# Patient Record
Sex: MALE | Race: BLACK OR AFRICAN AMERICAN | Employment: UNEMPLOYED | URBAN - NONMETROPOLITAN AREA
[De-identification: names, ages, dates, MRNs, and addresses within clinical notes are randomized per-mention and may not be internally consistent; named-entity substitution may affect disease eponyms.]

---

## 2018-05-24 ENCOUNTER — OFFICE VISIT (OUTPATIENT)
Dept: UROLOGY | Age: 58
End: 2018-05-24
Payer: COMMERCIAL

## 2018-05-24 VITALS — BODY MASS INDEX: 26.69 KG/M2 | WEIGHT: 208 LBS | TEMPERATURE: 97.8 F | HEIGHT: 74 IN

## 2018-05-24 DIAGNOSIS — N40.1 BPH WITH OBSTRUCTION/LOWER URINARY TRACT SYMPTOMS: ICD-10-CM

## 2018-05-24 DIAGNOSIS — N13.8 BPH WITH OBSTRUCTION/LOWER URINARY TRACT SYMPTOMS: ICD-10-CM

## 2018-05-24 DIAGNOSIS — R31.0 GROSS HEMATURIA: ICD-10-CM

## 2018-05-24 DIAGNOSIS — N20.0 KIDNEY STONE: Primary | ICD-10-CM

## 2018-05-24 LAB
APPEARANCE FLUID: NORMAL
BILIRUBIN, POC: NORMAL
BLOOD URINE, POC: NORMAL
CLARITY, POC: CLEAR
COLOR, POC: YELLOW
GLUCOSE URINE, POC: NORMAL
KETONES, POC: NORMAL
LEUKOCYTE EST, POC: NORMAL
NITRITE, POC: NORMAL
PH, POC: 7
PROTEIN, POC: NORMAL
SPECIFIC GRAVITY, POC: 1.01
UROBILINOGEN, POC: 0.2

## 2018-05-24 PROCEDURE — 99203 OFFICE O/P NEW LOW 30 MIN: CPT | Performed by: PHYSICIAN ASSISTANT

## 2018-05-24 PROCEDURE — 51798 US URINE CAPACITY MEASURE: CPT | Performed by: PHYSICIAN ASSISTANT

## 2018-05-24 PROCEDURE — 81003 URINALYSIS AUTO W/O SCOPE: CPT | Performed by: PHYSICIAN ASSISTANT

## 2018-05-24 RX ORDER — TAMSULOSIN HYDROCHLORIDE 0.4 MG/1
0.4 CAPSULE ORAL DAILY
Qty: 30 CAPSULE | Refills: 3
Start: 2018-05-24 | End: 2018-10-29

## 2018-05-24 RX ORDER — LISINOPRIL 10 MG/1
20 TABLET ORAL DAILY
Status: ON HOLD | COMMUNITY
End: 2018-12-05 | Stop reason: HOSPADM

## 2018-05-24 RX ORDER — AMLODIPINE BESYLATE 10 MG/1
10 TABLET ORAL DAILY
Status: ON HOLD | COMMUNITY
End: 2018-12-05 | Stop reason: HOSPADM

## 2018-05-24 RX ORDER — TAMSULOSIN HYDROCHLORIDE 0.4 MG/1
0.4 CAPSULE ORAL DAILY
COMMUNITY
End: 2018-08-16

## 2018-05-24 RX ORDER — ATORVASTATIN CALCIUM 10 MG/1
10 TABLET, FILM COATED ORAL DAILY
Status: ON HOLD | COMMUNITY
End: 2018-08-16 | Stop reason: ALTCHOICE

## 2018-05-24 RX ORDER — FINASTERIDE 5 MG/1
5 TABLET, FILM COATED ORAL DAILY
COMMUNITY
End: 2018-10-29

## 2018-05-24 RX ORDER — DOXYCYCLINE HYCLATE 50 MG/1
324 CAPSULE, GELATIN COATED ORAL
Status: ON HOLD | COMMUNITY
End: 2018-08-16 | Stop reason: ALTCHOICE

## 2018-05-24 ASSESSMENT — ENCOUNTER SYMPTOMS
SORE THROAT: 0
DOUBLE VISION: 0
NAUSEA: 0
SHORTNESS OF BREATH: 0
WHEEZING: 0
HEARTBURN: 0
BLURRED VISION: 0

## 2018-07-05 ENCOUNTER — TELEPHONE (OUTPATIENT)
Dept: UROLOGY | Age: 58
End: 2018-07-05

## 2018-08-13 ENCOUNTER — PROCEDURE VISIT (OUTPATIENT)
Dept: UROLOGY | Age: 58
End: 2018-08-13
Payer: COMMERCIAL

## 2018-08-13 VITALS — WEIGHT: 210 LBS | BODY MASS INDEX: 26.95 KG/M2 | TEMPERATURE: 98.2 F | HEIGHT: 74 IN

## 2018-08-13 DIAGNOSIS — N13.8 BPH WITH OBSTRUCTION/LOWER URINARY TRACT SYMPTOMS: Primary | ICD-10-CM

## 2018-08-13 DIAGNOSIS — R31.0 GROSS HEMATURIA: ICD-10-CM

## 2018-08-13 DIAGNOSIS — N20.0 RENAL CALCULUS, LEFT: ICD-10-CM

## 2018-08-13 DIAGNOSIS — N40.1 BPH WITH OBSTRUCTION/LOWER URINARY TRACT SYMPTOMS: Primary | ICD-10-CM

## 2018-08-13 LAB
BILIRUBIN, POC: 0
BLOOD URINE, POC: NORMAL
CLARITY, POC: CLEAR
COLOR, POC: YELLOW
GLUCOSE URINE, POC: NORMAL
KETONES, POC: NORMAL
LEUKOCYTE EST, POC: NORMAL
NITRITE, POC: NORMAL
PH, POC: 6
PROTEIN, POC: NORMAL
SPECIFIC GRAVITY, POC: 1.02
UROBILINOGEN, POC: 1

## 2018-08-13 PROCEDURE — 81003 URINALYSIS AUTO W/O SCOPE: CPT | Performed by: UROLOGY

## 2018-08-13 PROCEDURE — 52000 CYSTOURETHROSCOPY: CPT | Performed by: UROLOGY

## 2018-08-13 PROCEDURE — 99214 OFFICE O/P EST MOD 30 MIN: CPT | Performed by: UROLOGY

## 2018-08-13 NOTE — LETTER
CONSENT TO OPERATION  AND/OR OTHER PROCEDURE(S)          PATIENT :  Telma Medrano            YOB: 1960      DATE :  8/13/18        1. I request and consent that Dr. Roxanne Mayes, and/or his associates or assistants perform an operation and/or procedures on the above patient at  [facility], to treat the condition(s) which appear indicated by the diagnostic studies already performed. I have been told that in general terms the nature, purpose and reasonable expectations of the operation and/or procedure(s) are:     2. It has been explained to me by the informing physician that during the course of the operation and/or procedure(s) unforeseen conditions may be revealed that necessitate an extension of the original operation and/or procedure(s) or different operation and/or procedures than those set forth in Paragraph 1. I therefore authorize and request that my physician and/or his associates or assistants perform such operations and/or procedures as are necessary and desirable in the exercise of professional judgment. The authority granted under this Paragraph 2 shall extend to all conditions that require treatment and are known to my physician at the time the operation is commenced. 3. I have been made aware by the informing physician of certain risks and consequences that are associated with the operation and/or procedure(s) described in Paragraph 1, the reasonable alternative methods or treatment, the possible consequences, the possibility that the operation and/or procedure(s) may be unsuccessful and the possibility of complications. I understand the reasonably known risks to be:      ? Bleeding  ? Infection  ? Poor Healing  ? Possible Damage to Nerve, Vessel, Tendon/Muscle or Bone  ? Need for further Treatment/Surgery  ? Stiffness  ? Pain  ? Residual or Recurrent Symptoms  ? Anesthetic and/or Medical Risks          4.  I have also been informed by the informing physician that there are other risks from both known and unknown causes that are attendant to the performance of any surgical procedure. I am aware that the practice of medicine and surgery is not an exact science, and that no guarantees have been made to me concerning the results of the operation and/or procedure(s). 5. I   CONSENT / REFUSE CONSENT  (strike the phrase that does not apply) to the taking of photographs before, during and/or after the operation or procedure for scientific/educational purposes. 6. I consent to the administration of anesthesia and to the use of such anesthetics as may be deemed advisable by the anesthesiologist who has been engaged by me or my physician. 7. I certify that I have read and understand the above consent to operation and/or other procedure(s); that the explanations therein referred to were made to me by the informing physician in advance of my signing this consent; that all blanks or statements requiring insertion or completion were filled in and inapplicable paragraphs, if any, were stricken before I signed; and that all questions asked by me about the operation and/or procedure(s) which I have consented to have been fully answered in a satisfactory manner.             _______________________  Witness        Signature Of Patient        _______________________  Informing Physician         Signature of Informing Physician           If patient is unable to sign or is a minor, complete one of the following:    (A)  Patient is a minor   years of age. (B)  Patient is unable to sign because: The undersigned represents that he or she is duly authorized to execute this consent for and on behalf of the above named patient.                Witness         o  Parent  o  Guardian   o  Spouse      o  Other (specify)

## 2018-08-13 NOTE — PROGRESS NOTES
Hematuria  Patient complains of gross hematuria. Onset of hematuria was 5 months ago and was sudden in onset. There is a history of nephrolithiasis. There is not a history of urologic trauma. Other urologic symptoms include slow stream, hesitancy, intermittency, nocturia, urgency, sense of residual urine. . Prior workup has been UA'S, CT. Patient had CT urogram 05/14/2018 which showed a nonobstructing stone in the left renal pelvis. Patient states he had sudden onset of gross hematuria with clots which lasted about a month patient states the past month it is been clear. He does have history of urinary tract symptoms both obstructive and irritative. He is on finasteride and tamsulosin.      Obstructive and irritative urinary tract symptoms:  Patient is a 79-year-old gentleman with several month history of worsening urinary tract symptoms. He is on finasteride and tamsulosin. He describes poor stream and straining and hesitancy and feeling of incomplete emptying as well as irritative symptoms are urgency frequency and nocturia. He did have a PSA done 04/18/18 that was 1.79. There is no family or personal history of prostate cancer. Patient had episode of gross hematuria which lasted about a month for the past month his urine has been clear. CT urogram states there is moderate enlargement of the prostate.     Kidney stones:  Patient is a 79-year-old gentleman referred to us for gross hematuria and voiding dysfunction who in the course of his evaluation had a CT urogram done. I was not able to view the disc but the report did state there is a nonobstructing calcification within the renal pelvis but there is no mention of size. Patient is not aware of previous history kidney stones he comes in now after getting a KUB.     Is here for cystoscopy for evaluation of hematuria and his obstructive voiding symptoms        Past Medical History        Past Medical History:   Diagnosis Date    GERD (gastroesophageal reflux

## 2018-08-14 ENCOUNTER — TELEPHONE (OUTPATIENT)
Dept: UROLOGY | Age: 58
End: 2018-08-14

## 2018-08-15 ENCOUNTER — TELEPHONE (OUTPATIENT)
Dept: UROLOGY | Age: 58
End: 2018-08-15

## 2018-08-15 NOTE — TELEPHONE ENCOUNTER
I spoke with [facility] from the halfway facility and I let her know that I spoke with Shauna Quick, and that his recommendation was to keep him on the Bactrim until his culture came back and to alternate between Tylenol and Motrin to keep the fever down, but if he seemed to be septic, he needed to go to the ER. She voiced understanding and stated that she would fax a copy of the culture when it came in.

## 2018-08-15 NOTE — TELEPHONE ENCOUNTER
Please call clifton at the [facility]. Patient having symptoms after cysto. She faxed over some notes and labs. Please see fax in media.

## 2018-08-16 ENCOUNTER — HOSPITAL ENCOUNTER (INPATIENT)
Age: 58
LOS: 7 days | Discharge: HOME | DRG: 872 | End: 2018-08-23
Attending: EMERGENCY MEDICINE | Admitting: UROLOGY
Payer: COMMERCIAL

## 2018-08-16 ENCOUNTER — APPOINTMENT (OUTPATIENT)
Dept: CT IMAGING | Age: 58
DRG: 872 | End: 2018-08-16
Payer: COMMERCIAL

## 2018-08-16 ENCOUNTER — TELEPHONE (OUTPATIENT)
Dept: UROLOGY | Age: 58
End: 2018-08-16

## 2018-08-16 DIAGNOSIS — A41.9 SEPSIS, DUE TO UNSPECIFIED ORGANISM: Primary | ICD-10-CM

## 2018-08-16 DIAGNOSIS — N20.0 NEPHROLITHIASIS: ICD-10-CM

## 2018-08-16 DIAGNOSIS — N17.9 ACUTE RENAL FAILURE, UNSPECIFIED ACUTE RENAL FAILURE TYPE (HCC): ICD-10-CM

## 2018-08-16 DIAGNOSIS — N40.1 BENIGN PROSTATIC HYPERPLASIA WITH LOWER URINARY TRACT SYMPTOMS, SYMPTOM DETAILS UNSPECIFIED: ICD-10-CM

## 2018-08-16 LAB
ALBUMIN SERPL-MCNC: 3.6 G/DL (ref 3.5–5.2)
ALP BLD-CCNC: 55 U/L (ref 40–130)
ALT SERPL-CCNC: 12 U/L (ref 5–41)
ANION GAP SERPL CALCULATED.3IONS-SCNC: 12 MMOL/L (ref 7–19)
AST SERPL-CCNC: 23 U/L (ref 5–40)
BACTERIA: ABNORMAL /HPF
BANDED NEUTROPHILS RELATIVE PERCENT: 24 % (ref 0–5)
BASOPHILS ABSOLUTE: 0 K/UL (ref 0–0.2)
BASOPHILS MANUAL: 0 %
BASOPHILS RELATIVE PERCENT: 0 % (ref 0–1)
BILIRUB SERPL-MCNC: 0.3 MG/DL (ref 0.2–1.2)
BILIRUBIN URINE: ABNORMAL
BLOOD, URINE: ABNORMAL
BUN BLDV-MCNC: 49 MG/DL (ref 6–20)
CALCIUM SERPL-MCNC: 9.1 MG/DL (ref 8.6–10)
CASTS: ABNORMAL /LPF
CHLORIDE BLD-SCNC: 99 MMOL/L (ref 98–111)
CLARITY: ABNORMAL
CO2: 23 MMOL/L (ref 22–29)
COLOR: ABNORMAL
CREAT SERPL-MCNC: 3.7 MG/DL (ref 0.5–1.2)
EOSINOPHILS ABSOLUTE: 0 K/UL (ref 0–0.6)
EOSINOPHILS RELATIVE PERCENT: 0 % (ref 0–5)
EPITHELIAL CELLS, UA: ABNORMAL /HPF
GFR NON-AFRICAN AMERICAN: 17
GLUCOSE BLD-MCNC: 119 MG/DL (ref 74–109)
GLUCOSE URINE: NEGATIVE MG/DL
HCT VFR BLD CALC: 30.8 % (ref 42–52)
HEMOGLOBIN: 10.2 G/DL (ref 14–18)
KETONES, URINE: NEGATIVE MG/DL
LACTIC ACID: 2 MMOL/L (ref 0.5–1.9)
LEUKOCYTE ESTERASE, URINE: ABNORMAL
LYMPHOCYTES ABSOLUTE: 1.2 K/UL (ref 1.1–4.5)
LYMPHOCYTES RELATIVE PERCENT: 3 % (ref 20–40)
MCH RBC QN AUTO: 28 PG (ref 27–31)
MCHC RBC AUTO-ENTMCNC: 33.1 G/DL (ref 33–37)
MCV RBC AUTO: 84.6 FL (ref 80–94)
MONOCYTES ABSOLUTE: 1.2 K/UL (ref 0–0.9)
MONOCYTES RELATIVE PERCENT: 3 % (ref 0–10)
MUCUS: ABNORMAL /LPF
NEUTROPHILS ABSOLUTE: 37 K/UL (ref 1.5–7.5)
NEUTROPHILS MANUAL: 70 %
NEUTROPHILS RELATIVE PERCENT: 70 % (ref 50–65)
NITRITE, URINE: NEGATIVE
PDW BLD-RTO: 13.4 % (ref 11.5–14.5)
PH UA: 5
PLATELET # BLD: 295 K/UL (ref 130–400)
PLATELET SLIDE REVIEW: ADEQUATE
PMV BLD AUTO: 10.2 FL (ref 9.4–12.4)
POTASSIUM SERPL-SCNC: 3.9 MMOL/L (ref 3.5–5)
PROTEIN UA: ABNORMAL MG/DL
RBC # BLD: 3.64 M/UL (ref 4.7–6.1)
RBC # BLD: NORMAL 10*6/UL
RBC UA: ABNORMAL /HPF (ref 0–2)
SODIUM BLD-SCNC: 134 MMOL/L (ref 136–145)
SPECIFIC GRAVITY UA: 1.02
TOTAL PROTEIN: 7.1 G/DL (ref 6.6–8.7)
URINE REFLEX TO CULTURE: YES
UROBILINOGEN, URINE: 0.2 E.U./DL
WBC # BLD: 39.4 K/UL (ref 4.8–10.8)
WBC UA: ABNORMAL /HPF (ref 0–5)

## 2018-08-16 PROCEDURE — 2580000003 HC RX 258: Performed by: NURSE PRACTITIONER

## 2018-08-16 PROCEDURE — 2000000000 HC ICU R&B

## 2018-08-16 PROCEDURE — 74150 CT ABDOMEN W/O CONTRAST: CPT

## 2018-08-16 PROCEDURE — 87186 SC STD MICRODIL/AGAR DIL: CPT

## 2018-08-16 PROCEDURE — 81001 URINALYSIS AUTO W/SCOPE: CPT

## 2018-08-16 PROCEDURE — 6360000002 HC RX W HCPCS: Performed by: EMERGENCY MEDICINE

## 2018-08-16 PROCEDURE — 87077 CULTURE AEROBIC IDENTIFY: CPT

## 2018-08-16 PROCEDURE — 2580000003 HC RX 258: Performed by: EMERGENCY MEDICINE

## 2018-08-16 PROCEDURE — 6360000002 HC RX W HCPCS: Performed by: UROLOGY

## 2018-08-16 PROCEDURE — 36415 COLL VENOUS BLD VENIPUNCTURE: CPT

## 2018-08-16 PROCEDURE — 80053 COMPREHEN METABOLIC PANEL: CPT

## 2018-08-16 PROCEDURE — 99285 EMERGENCY DEPT VISIT HI MDM: CPT

## 2018-08-16 PROCEDURE — 6370000000 HC RX 637 (ALT 250 FOR IP): Performed by: UROLOGY

## 2018-08-16 PROCEDURE — 2580000003 HC RX 258: Performed by: UROLOGY

## 2018-08-16 PROCEDURE — 99223 1ST HOSP IP/OBS HIGH 75: CPT | Performed by: UROLOGY

## 2018-08-16 PROCEDURE — 99285 EMERGENCY DEPT VISIT HI MDM: CPT | Performed by: EMERGENCY MEDICINE

## 2018-08-16 PROCEDURE — 93005 ELECTROCARDIOGRAM TRACING: CPT

## 2018-08-16 PROCEDURE — 94664 DEMO&/EVAL PT USE INHALER: CPT

## 2018-08-16 PROCEDURE — 85025 COMPLETE CBC W/AUTO DIFF WBC: CPT

## 2018-08-16 PROCEDURE — 87086 URINE CULTURE/COLONY COUNT: CPT

## 2018-08-16 PROCEDURE — 6360000002 HC RX W HCPCS: Performed by: INTERNAL MEDICINE

## 2018-08-16 PROCEDURE — 83605 ASSAY OF LACTIC ACID: CPT

## 2018-08-16 PROCEDURE — 87040 BLOOD CULTURE FOR BACTERIA: CPT

## 2018-08-16 RX ORDER — SODIUM CHLORIDE 0.9 % (FLUSH) 0.9 %
10 SYRINGE (ML) INJECTION PRN
Status: DISCONTINUED | OUTPATIENT
Start: 2018-08-16 | End: 2018-08-18

## 2018-08-16 RX ORDER — 0.9 % SODIUM CHLORIDE 0.9 %
1000 INTRAVENOUS SOLUTION INTRAVENOUS ONCE
Status: COMPLETED | OUTPATIENT
Start: 2018-08-16 | End: 2018-08-16

## 2018-08-16 RX ORDER — SODIUM CHLORIDE 0.9 % (FLUSH) 0.9 %
10 SYRINGE (ML) INJECTION EVERY 12 HOURS SCHEDULED
Status: DISCONTINUED | OUTPATIENT
Start: 2018-08-16 | End: 2018-08-18

## 2018-08-16 RX ORDER — SODIUM CHLORIDE 9 MG/ML
INJECTION, SOLUTION INTRAVENOUS CONTINUOUS
Status: DISCONTINUED | OUTPATIENT
Start: 2018-08-16 | End: 2018-08-18

## 2018-08-16 RX ORDER — FERROUS GLUCONATE 324(37.5)
324 TABLET ORAL
Status: DISCONTINUED | OUTPATIENT
Start: 2018-08-17 | End: 2018-08-18

## 2018-08-16 RX ORDER — ONDANSETRON 2 MG/ML
4 INJECTION INTRAMUSCULAR; INTRAVENOUS EVERY 4 HOURS PRN
Status: DISCONTINUED | OUTPATIENT
Start: 2018-08-16 | End: 2018-08-23 | Stop reason: HOSPADM

## 2018-08-16 RX ORDER — TAMSULOSIN HYDROCHLORIDE 0.4 MG/1
0.8 CAPSULE ORAL DAILY
Status: DISCONTINUED | OUTPATIENT
Start: 2018-08-16 | End: 2018-08-23 | Stop reason: HOSPADM

## 2018-08-16 RX ORDER — ATORVASTATIN CALCIUM 10 MG/1
10 TABLET, FILM COATED ORAL DAILY
Status: DISCONTINUED | OUTPATIENT
Start: 2018-08-17 | End: 2018-08-18

## 2018-08-16 RX ORDER — LISINOPRIL 10 MG/1
10 TABLET ORAL DAILY
Status: DISCONTINUED | OUTPATIENT
Start: 2018-08-17 | End: 2018-08-23 | Stop reason: HOSPADM

## 2018-08-16 RX ORDER — ACETAMINOPHEN 325 MG/1
650 TABLET ORAL EVERY 4 HOURS PRN
Status: DISCONTINUED | OUTPATIENT
Start: 2018-08-16 | End: 2018-08-23 | Stop reason: HOSPADM

## 2018-08-16 RX ORDER — SODIUM CHLORIDE 0.9 % (FLUSH) 0.9 %
10 SYRINGE (ML) INJECTION EVERY 12 HOURS SCHEDULED
Status: DISCONTINUED | OUTPATIENT
Start: 2018-08-16 | End: 2018-08-23 | Stop reason: HOSPADM

## 2018-08-16 RX ORDER — AMLODIPINE BESYLATE 10 MG/1
10 TABLET ORAL DAILY
Status: DISCONTINUED | OUTPATIENT
Start: 2018-08-16 | End: 2018-08-23 | Stop reason: HOSPADM

## 2018-08-16 RX ORDER — MEPERIDINE HYDROCHLORIDE 50 MG/ML
6.25 INJECTION INTRAMUSCULAR; INTRAVENOUS; SUBCUTANEOUS 2 TIMES DAILY PRN
Status: DISCONTINUED | OUTPATIENT
Start: 2018-08-16 | End: 2018-08-23 | Stop reason: HOSPADM

## 2018-08-16 RX ORDER — SODIUM CHLORIDE 0.9 % (FLUSH) 0.9 %
10 SYRINGE (ML) INJECTION PRN
Status: DISCONTINUED | OUTPATIENT
Start: 2018-08-16 | End: 2018-08-23 | Stop reason: HOSPADM

## 2018-08-16 RX ORDER — SODIUM CHLORIDE 9 MG/ML
INJECTION, SOLUTION INTRAVENOUS CONTINUOUS
Status: DISCONTINUED | OUTPATIENT
Start: 2018-08-16 | End: 2018-08-23 | Stop reason: HOSPADM

## 2018-08-16 RX ORDER — FINASTERIDE 5 MG/1
5 TABLET, FILM COATED ORAL DAILY
Status: DISCONTINUED | OUTPATIENT
Start: 2018-08-16 | End: 2018-08-23 | Stop reason: HOSPADM

## 2018-08-16 RX ORDER — FAMOTIDINE 20 MG/1
20 TABLET, FILM COATED ORAL 2 TIMES DAILY
Status: DISCONTINUED | OUTPATIENT
Start: 2018-08-16 | End: 2018-08-17 | Stop reason: DRUGHIGH

## 2018-08-16 RX ADMIN — ACETAMINOPHEN 650 MG: 325 TABLET ORAL at 15:46

## 2018-08-16 RX ADMIN — MEPERIDINE HYDROCHLORIDE 6.5 MG: 50 INJECTION, SOLUTION INTRAMUSCULAR; INTRAVENOUS; SUBCUTANEOUS at 20:13

## 2018-08-16 RX ADMIN — SODIUM CHLORIDE 1000 ML: 9 INJECTION, SOLUTION INTRAVENOUS at 11:30

## 2018-08-16 RX ADMIN — FAMOTIDINE 20 MG: 20 TABLET, FILM COATED ORAL at 20:13

## 2018-08-16 RX ADMIN — ONDANSETRON 4 MG: 2 INJECTION INTRAMUSCULAR; INTRAVENOUS at 19:38

## 2018-08-16 RX ADMIN — SODIUM CHLORIDE: 9 INJECTION, SOLUTION INTRAVENOUS at 15:49

## 2018-08-16 RX ADMIN — Medication 10 ML: at 20:13

## 2018-08-16 RX ADMIN — ACETAMINOPHEN 650 MG: 325 TABLET ORAL at 19:45

## 2018-08-16 RX ADMIN — SODIUM CHLORIDE: 9 INJECTION, SOLUTION INTRAVENOUS at 17:13

## 2018-08-16 RX ADMIN — TAMSULOSIN HYDROCHLORIDE 0.8 MG: 0.4 CAPSULE ORAL at 15:46

## 2018-08-16 RX ADMIN — MEROPENEM 1 G: 1 INJECTION, POWDER, FOR SOLUTION INTRAVENOUS at 21:11

## 2018-08-16 RX ADMIN — MEROPENEM 1 G: 1 INJECTION, POWDER, FOR SOLUTION INTRAVENOUS at 13:37

## 2018-08-16 RX ADMIN — ENOXAPARIN SODIUM 30 MG: 30 INJECTION SUBCUTANEOUS at 15:46

## 2018-08-16 ASSESSMENT — PAIN SCALES - GENERAL
PAINLEVEL_OUTOF10: 0
PAINLEVEL_OUTOF10: 3
PAINLEVEL_OUTOF10: 0
PAINLEVEL_OUTOF10: 3

## 2018-08-16 ASSESSMENT — ENCOUNTER SYMPTOMS
VOMITING: 0
CONSTIPATION: 0
NAUSEA: 0
ABDOMINAL PAIN: 1
DIARRHEA: 0

## 2018-08-16 NOTE — ED PROVIDER NOTES
Attending Supervisory Note/Shared Visit   I have personally performed a face to face diagnostic evaluation on this patient. I have reviewed the mid-levels findings and agree. Mr. Beatriz Gibson is a 61-year-old male presents to the emergency department from [facility] due to fever and urinary retention. Spoke with the fever had a temperature of 103 Fahrenheit yesterday. Patient is given a gram of Rocephin and bactrim yesterday. He has a long history of enlarged prostate and is followed by Dr. Viktoria Jackson scheduled upcoming TURP. Patient saw Viktoria Jackson on 8/13/18 most recently for gross hematuria that started 5moths ago. May 14 had CT that showed non obstructing stone in left renal pelvis. He is on medical therapy for BPH. Per Dr. Viktoria Jackson note recommends possibly taking care of stone before TURP. Patient admits to 2 episodes of watery diarrhea this AM but otherwise no other infectious symptoms. VSS, non toxic, RRR no murmur, lungs clear, abd soft with some mild suprapubic and LLQ pain on exam, no cva tenderness, no LE edema    Proceed with labs and CT imaging, BP initially low in 80s now 117/77 afebrile here, cont to monitor 1217    Patient with significant leukocytosis, acute renal failure, with recent renal function creatinine 1.5 yesterday now worsened, will d/w urology [address] [place] [address],[address] spoke with Dr. Viktoria Jackson request meropenem, heart, admit to ICU, orders placed, pt cont to be HDS      FINAL IMPRESSION      1. Sepsis, due to unspecified organism ([address]    2. Acute renal failure, unspecified acute renal failure type ([address]    3. Benign prostatic hyperplasia with lower urinary tract symptoms, symptom details unspecified    4.  Nephrolithiasis          Layla Davis MD  Attending Emergency Physician       Jihan Mckee MD  08/16/18 7940
CULTURE BLOOD #2   URINE RT REFLEX TO CULTURE       All other labs were within normal range or not returned as of this dictation. RE-ASSESSMENT        EMERGENCY DEPARTMENT COURSE and DIFFERENTIAL DIAGNOSIS/MDM:   Vitals:    Vitals:    08/16/18 1114 08/16/18 1202   BP: 80/68 117/77   Pulse: 88    Temp: 98.2 °F (36.8 °C)    TempSrc: Oral    SpO2: 92%        MDM  Number of Diagnoses or Management Options  Acute renal failure, unspecified acute renal failure type [facility]): new, no workup  Benign prostatic hyperplasia with lower urinary tract symptoms, symptom details unspecified: new, needed workup  Nephrolithiasis: new, needed workup  Sepsis, due to unspecified organism [facility]): new, needed workup  Diagnosis management comments: Will initiate IV, send blood and urine for analysis, establish IV fluids, obtain CT renal.  Case discussed with Dr. Christi Ugalde. CT reveals 9 mm nonobstructing calculus; bladder wall thicking due to outlet obstruction-infiltrative malignancy not excluded. Labs reveal leukocytosis and acute renal failure  Dr. Christi Ugalde updated. Consulted Dr. Gilbert Hummel. He accepted patient for admission to ICU. He requested meropenem and a heart. Amount and/or Complexity of Data Reviewed  Clinical lab tests: ordered and reviewed  Tests in the radiology section of CPT®: ordered and reviewed  Discuss the patient with other providers: yes (Dr. Christi Ugalde  1250: Dr. Gilbert Hummel)          Procedures      FINAL IMPRESSION      1. Sepsis, due to unspecified organism ([address]    2. Acute renal failure, unspecified acute renal failure type ([address]    3. Ureterolithiasis    4.  Benign prostatic hyperplasia with lower urinary tract symptoms, symptom details unspecified          DISPOSITION/PLAN   DISPOSITION        PATIENT REFERRED TO:  Unspecified [facility]            DISCHARGE MEDICATIONS:  New Prescriptions    No medications on file       (Please note that portions of this note were completed with a voice recognition program.  Efforts

## 2018-08-16 NOTE — TELEPHONE ENCOUNTER
I spoke with [facility] from the [facility] that houses Mr Iman Anton and she stated that his bp was 70/40, pulse was 104, and not running a fever. She wanted to know what Dr Tatianna Diez recommendations were. (see the previous telephone encounter) I spoke with Dr Clarissa Serrato about this and he stated that with him being hypotensive and tachycardic, he might be septic and needs to be transported to the ER for evaluation.  Linda voiced understanding and stated that she would have him transferred to the ER

## 2018-08-17 LAB
ANION GAP SERPL CALCULATED.3IONS-SCNC: 14 MMOL/L (ref 7–19)
ATYPICAL LYMPHOCYTE RELATIVE PERCENT: 1 % (ref 0–8)
BANDED NEUTROPHILS RELATIVE PERCENT: 1 % (ref 0–5)
BASOPHILS ABSOLUTE: 0 K/UL (ref 0–0.2)
BASOPHILS MANUAL: 0 %
BASOPHILS RELATIVE PERCENT: 0 % (ref 0–1)
BUN BLDV-MCNC: 45 MG/DL (ref 6–20)
BURR CELLS: ABNORMAL
CALCIUM SERPL-MCNC: 8.2 MG/DL (ref 8.6–10)
CHLORIDE BLD-SCNC: 101 MMOL/L (ref 98–111)
CO2: 19 MMOL/L (ref 22–29)
CREAT SERPL-MCNC: 2.3 MG/DL (ref 0.5–1.2)
EOSINOPHILS ABSOLUTE: 0 K/UL (ref 0–0.6)
EOSINOPHILS RELATIVE PERCENT: 0 % (ref 0–5)
GFR NON-AFRICAN AMERICAN: 30
GLUCOSE BLD-MCNC: 113 MG/DL (ref 74–109)
HCT VFR BLD CALC: 30.6 % (ref 42–52)
HEMOGLOBIN: 9.4 G/DL (ref 14–18)
HYPOCHROMIA: ABNORMAL
LYMPHOCYTES ABSOLUTE: 3.1 K/UL (ref 1.1–4.5)
LYMPHOCYTES RELATIVE PERCENT: 10 % (ref 20–40)
MCH RBC QN AUTO: 27.1 PG (ref 27–31)
MCHC RBC AUTO-ENTMCNC: 30.7 G/DL (ref 33–37)
MCV RBC AUTO: 88.2 FL (ref 80–94)
MONOCYTES ABSOLUTE: 0 K/UL (ref 0–0.9)
MONOCYTES RELATIVE PERCENT: 0 % (ref 0–10)
NEUTROPHILS ABSOLUTE: 25.1 K/UL (ref 1.5–7.5)
NEUTROPHILS MANUAL: 88 %
NEUTROPHILS RELATIVE PERCENT: 88 % (ref 50–65)
OVALOCYTES: ABNORMAL
PDW BLD-RTO: 13.7 % (ref 11.5–14.5)
PLATELET # BLD: 244 K/UL (ref 130–400)
PLATELET SLIDE REVIEW: ADEQUATE
PMV BLD AUTO: 9.9 FL (ref 9.4–12.4)
POLYCHROMASIA: ABNORMAL
POTASSIUM REFLEX MAGNESIUM: 3.9 MMOL/L (ref 3.5–5)
RBC # BLD: 3.47 M/UL (ref 4.7–6.1)
SODIUM BLD-SCNC: 134 MMOL/L (ref 136–145)
WBC # BLD: 28.2 K/UL (ref 4.8–10.8)

## 2018-08-17 PROCEDURE — 6360000002 HC RX W HCPCS: Performed by: INTERNAL MEDICINE

## 2018-08-17 PROCEDURE — 85025 COMPLETE CBC W/AUTO DIFF WBC: CPT

## 2018-08-17 PROCEDURE — 6370000000 HC RX 637 (ALT 250 FOR IP): Performed by: UROLOGY

## 2018-08-17 PROCEDURE — 87040 BLOOD CULTURE FOR BACTERIA: CPT

## 2018-08-17 PROCEDURE — 36415 COLL VENOUS BLD VENIPUNCTURE: CPT

## 2018-08-17 PROCEDURE — 99232 SBSQ HOSP IP/OBS MODERATE 35: CPT | Performed by: UROLOGY

## 2018-08-17 PROCEDURE — 80048 BASIC METABOLIC PNL TOTAL CA: CPT

## 2018-08-17 PROCEDURE — 2580000003 HC RX 258: Performed by: UROLOGY

## 2018-08-17 PROCEDURE — 6360000002 HC RX W HCPCS: Performed by: UROLOGY

## 2018-08-17 PROCEDURE — 2000000000 HC ICU R&B

## 2018-08-17 RX ORDER — LEVOFLOXACIN 5 MG/ML
750 INJECTION, SOLUTION INTRAVENOUS ONCE
Status: COMPLETED | OUTPATIENT
Start: 2018-08-17 | End: 2018-08-17

## 2018-08-17 RX ORDER — FAMOTIDINE 20 MG/1
20 TABLET, FILM COATED ORAL NIGHTLY
Status: DISCONTINUED | OUTPATIENT
Start: 2018-08-17 | End: 2018-08-18

## 2018-08-17 RX ADMIN — MEPERIDINE HYDROCHLORIDE 6.5 MG: 50 INJECTION, SOLUTION INTRAMUSCULAR; INTRAVENOUS; SUBCUTANEOUS at 10:58

## 2018-08-17 RX ADMIN — MEROPENEM 1 G: 1 INJECTION, POWDER, FOR SOLUTION INTRAVENOUS at 05:02

## 2018-08-17 RX ADMIN — LISINOPRIL 10 MG: 10 TABLET ORAL at 08:25

## 2018-08-17 RX ADMIN — ENOXAPARIN SODIUM 30 MG: 30 INJECTION SUBCUTANEOUS at 14:56

## 2018-08-17 RX ADMIN — TAMSULOSIN HYDROCHLORIDE 0.8 MG: 0.4 CAPSULE ORAL at 08:25

## 2018-08-17 RX ADMIN — ONDANSETRON 4 MG: 2 INJECTION INTRAMUSCULAR; INTRAVENOUS at 19:01

## 2018-08-17 RX ADMIN — ACETAMINOPHEN 650 MG: 325 TABLET ORAL at 10:55

## 2018-08-17 RX ADMIN — FAMOTIDINE 20 MG: 20 TABLET, FILM COATED ORAL at 20:09

## 2018-08-17 RX ADMIN — Medication 10 ML: at 20:09

## 2018-08-17 RX ADMIN — Medication 10 ML: at 08:28

## 2018-08-17 RX ADMIN — MEROPENEM 1 G: 1 INJECTION, POWDER, FOR SOLUTION INTRAVENOUS at 16:50

## 2018-08-17 RX ADMIN — AMLODIPINE BESYLATE 10 MG: 10 TABLET ORAL at 08:25

## 2018-08-17 RX ADMIN — FINASTERIDE 5 MG: 5 TABLET, FILM COATED ORAL at 08:25

## 2018-08-17 RX ADMIN — ACETAMINOPHEN 650 MG: 325 TABLET ORAL at 21:06

## 2018-08-17 RX ADMIN — ACETAMINOPHEN 650 MG: 325 TABLET ORAL at 14:56

## 2018-08-17 RX ADMIN — ACETAMINOPHEN 650 MG: 325 TABLET ORAL at 05:06

## 2018-08-17 RX ADMIN — LEVOFLOXACIN 750 MG: 5 INJECTION, SOLUTION INTRAVENOUS at 20:09

## 2018-08-17 ASSESSMENT — PAIN SCALES - GENERAL
PAINLEVEL_OUTOF10: 6
PAINLEVEL_OUTOF10: 6
PAINLEVEL_OUTOF10: 2
PAINLEVEL_OUTOF10: 2
PAINLEVEL_OUTOF10: 0
PAINLEVEL_OUTOF10: 0

## 2018-08-17 ASSESSMENT — ENCOUNTER SYMPTOMS: VOMITING: 0

## 2018-08-17 ASSESSMENT — PAIN DESCRIPTION - LOCATION: LOCATION: OTHER (COMMENT)

## 2018-08-17 NOTE — H&P
RACHELL AIRVEND OF [facility] [address], [address] [facility]                               HISTORY AND PHYSICAL    PATIENT NAME: Clem Beach                      :        1960  MED REC NO:   [id number]                              ROOM:       [facility]  ACCOUNT NO:   [de-identified]                           ADMIT DATE: 2018  PROVIDER:     Gregg Alanis MD      ADMISSION DIAGNOSES:  1. Sepsis,  source. 2.  Acute kidney injury. 3.  BPH with obstructive voiding symptoms. 4.  Kidney stone. HISTORY:  The patient is a 81-year-old gentleman who is in a [employer] work  camp. He was seen by me on 2018, for evaluation for gross hematuria. He had onset of hematuria approximately 5 months ago. He had obstructive  voiding symptoms, including a slow stream, hesitancy, intermittency,  nocturia and a sense of incomplete emptying. He had a CT urogram done on  2018, which showed a nonobstructing stone in the left kidney. No  hydronephrosis. He was started on maximal medical therapy with finasteride  and tamsulosin. He had a PSA in 2018 that was 1.79. Because of the  gross hematuria, the patient was evaluated with cystoscopy on , which  showed a very large prostate with 3+ trilobar prostatic hyperplasia and a  very large ball valve type median lobe protruding intravesically into the  bladder with obstructive changes and trabeculation. At that time of his  evaluation on , his urine was completely clear. Since he also had a  kidney stone which could be seen on KUB, the plan was to go ahead and treat  his kidney stone and then he would need followup TURP. However, the next  day he began having fevers and chills and more difficulty urinating. The  patient states that he had a fever up to 103. He was seen locally and he  thinks cultures from urine and blood were taken. He also was administered  antibiotics such as Rocephin and Bactrim.   He having rigors but is awake and alert. His  temperature was 103, pulse 82, respirations 22. Blood pressure 101/49,  this is improved after fluid resuscitation. HEENT:  His head is normocephalic, atraumatic. Pupils equal.  Sclerae  clear. Extraocular movements are intact. Mucous membranes are dry. NECK:  Trachea midline. No JVD. Full range of motion. No mass. CHEST:  Clear to auscultation bilaterally. HEART:  Regular, slightly tachy. No murmur. ABDOMEN:  Soft, nondistended, nontender, no palpable mass. Liver and  spleen are not enlarged. :  He has an uncircumcised phallus. He has a Ni catheter in place  draining clear urine. His testes are slightly swollen bilaterally but not  indurated and mildly tender. RECTAL:  Examination was deferred. SKIN:  Normal.  NEUROLOGIC:  Normal.  MUSCULOSKELETAL:  Both upper and lower extremities are without gross  abnormality. DIAGNOSTIC STUDIES:  Urinalysis was cloudy, small blood, small leukocyte  esterase, negative nitrite, 2 to 4 casts, 3 to 5 wbc's, 2 to 4 rbc's, 3 to  5 epithelial cells and trace bacteria. His white blood cell count was  13.94, hemoglobin 10.2, hematocrit 38.8, platelets 860. His sodium is 134,  potassium 3.9, chloride 99, bicarb 23, glucose 119, BUN is 49, creatinine  is 3.7, GFR is 17. Liver function tests normal.  Lactic acid was elevated  at 2.0. Blood, urine cultures have been taken. He has been started on  Merrem. X-ray studies:  He had a CT scan of the abdomen and pelvis without  contrast, showed urinary bladder wall thickening and a large prostate with  protrusion into the floor of the bladder. IMPRESSION:  1. Sepsis,  source. Cultures have been taken. We will continue  aggressive hydration. He has had some improvement and is hemodynamically  stable after some fluid resuscitation. I have started him on Merrem and  broad-spectrum cultures have been obtained.   Would ask infectious disease  consultation to assist in antibiotic coverage and for management of his  sepsis. 2.  Acute kidney injury secondary to #1 and also has had some Bactrim. We  will see if this resolves after fluid resuscitation, following some serial  creatinine. 3.  BPH with obstructive voiding symptoms. He is on maximal medical  therapy with tamsulosin and Flomax. We will leave Ni catheter in place  for now. 4.  Left renal calculus, nonobstructing. He was to be scheduled for ESWL. We will have to postpone this until his infection has cleared.           Savita Glass MD    D: 08/16/2018 21:06:43      T: 08/16/2018 21:10:07     [email or web address]  Job#: [id number]     Doc#: [id number]    CC:

## 2018-08-17 NOTE — PLAN OF CARE
Problem: Falls - Risk of:  Goal: Will remain free from falls  Will remain free from falls   Outcome: Met This Shift    Goal: Absence of physical injury  Absence of physical injury   Outcome: Met This Shift      Problem: Urinary Elimination:  Goal: Complications related to the disease process, condition or treatment will be avoided or minimized  Outcome: Ongoing

## 2018-08-18 LAB
ANION GAP SERPL CALCULATED.3IONS-SCNC: 10 MMOL/L (ref 7–19)
BANDED NEUTROPHILS RELATIVE PERCENT: 4 % (ref 0–5)
BASOPHILS ABSOLUTE: 0 K/UL (ref 0–0.2)
BASOPHILS MANUAL: 0 %
BASOPHILS RELATIVE PERCENT: 0 % (ref 0–1)
BUN BLDV-MCNC: 22 MG/DL (ref 6–20)
CALCIUM SERPL-MCNC: 8.3 MG/DL (ref 8.6–10)
CHLORIDE BLD-SCNC: 104 MMOL/L (ref 98–111)
CO2: 21 MMOL/L (ref 22–29)
CREAT SERPL-MCNC: 1.2 MG/DL (ref 0.5–1.2)
EOSINOPHILS ABSOLUTE: 0 K/UL (ref 0–0.6)
EOSINOPHILS RELATIVE PERCENT: 0 % (ref 0–5)
GFR NON-AFRICAN AMERICAN: >60
GLUCOSE BLD-MCNC: 121 MG/DL (ref 74–109)
HCT VFR BLD CALC: 28 % (ref 42–52)
HEMOGLOBIN: 9.3 G/DL (ref 14–18)
LYMPHOCYTES ABSOLUTE: 0.3 K/UL (ref 1.1–4.5)
LYMPHOCYTES RELATIVE PERCENT: 1 % (ref 20–40)
MCH RBC QN AUTO: 27.5 PG (ref 27–31)
MCHC RBC AUTO-ENTMCNC: 33.2 G/DL (ref 33–37)
MCV RBC AUTO: 82.8 FL (ref 80–94)
MONOCYTES ABSOLUTE: 0.5 K/UL (ref 0–0.9)
MONOCYTES RELATIVE PERCENT: 2 % (ref 0–10)
NEUTROPHILS ABSOLUTE: 25 K/UL (ref 1.5–7.5)
NEUTROPHILS MANUAL: 93 %
NEUTROPHILS RELATIVE PERCENT: 93 % (ref 50–65)
ORGANISM: ABNORMAL
OVALOCYTES: ABNORMAL
PDW BLD-RTO: 13.4 % (ref 11.5–14.5)
PLATELET # BLD: 264 K/UL (ref 130–400)
PLATELET SLIDE REVIEW: ADEQUATE
PMV BLD AUTO: 9.8 FL (ref 9.4–12.4)
POTASSIUM REFLEX MAGNESIUM: 3.6 MMOL/L (ref 3.5–5)
RBC # BLD: 3.38 M/UL (ref 4.7–6.1)
SODIUM BLD-SCNC: 135 MMOL/L (ref 136–145)
URINE CULTURE, ROUTINE: ABNORMAL
URINE CULTURE, ROUTINE: ABNORMAL
WBC # BLD: 25.8 K/UL (ref 4.8–10.8)

## 2018-08-18 PROCEDURE — 6360000002 HC RX W HCPCS: Performed by: UROLOGY

## 2018-08-18 PROCEDURE — 2580000003 HC RX 258: Performed by: UROLOGY

## 2018-08-18 PROCEDURE — 36415 COLL VENOUS BLD VENIPUNCTURE: CPT

## 2018-08-18 PROCEDURE — 80048 BASIC METABOLIC PNL TOTAL CA: CPT

## 2018-08-18 PROCEDURE — 6370000000 HC RX 637 (ALT 250 FOR IP): Performed by: UROLOGY

## 2018-08-18 PROCEDURE — 85025 COMPLETE CBC W/AUTO DIFF WBC: CPT

## 2018-08-18 PROCEDURE — 2580000003 HC RX 258: Performed by: INTERNAL MEDICINE

## 2018-08-18 PROCEDURE — 6360000002 HC RX W HCPCS: Performed by: INTERNAL MEDICINE

## 2018-08-18 PROCEDURE — 1210000000 HC MED SURG R&B

## 2018-08-18 PROCEDURE — 99232 SBSQ HOSP IP/OBS MODERATE 35: CPT | Performed by: UROLOGY

## 2018-08-18 RX ORDER — MORPHINE SULFATE 1 MG/ML
2 INJECTION, SOLUTION EPIDURAL; INTRATHECAL; INTRAVENOUS
Status: DISCONTINUED | OUTPATIENT
Start: 2018-08-18 | End: 2018-08-23 | Stop reason: HOSPADM

## 2018-08-18 RX ORDER — KETOROLAC TROMETHAMINE 30 MG/ML
15 INJECTION, SOLUTION INTRAMUSCULAR; INTRAVENOUS EVERY 8 HOURS
Status: COMPLETED | OUTPATIENT
Start: 2018-08-18 | End: 2018-08-21

## 2018-08-18 RX ORDER — FAMOTIDINE 20 MG/1
20 TABLET, FILM COATED ORAL 2 TIMES DAILY
Status: DISCONTINUED | OUTPATIENT
Start: 2018-08-18 | End: 2018-08-23 | Stop reason: HOSPADM

## 2018-08-18 RX ORDER — OXYCODONE HYDROCHLORIDE AND ACETAMINOPHEN 5; 325 MG/1; MG/1
2 TABLET ORAL EVERY 4 HOURS PRN
Status: DISCONTINUED | OUTPATIENT
Start: 2018-08-18 | End: 2018-08-23 | Stop reason: HOSPADM

## 2018-08-18 RX ADMIN — SODIUM CHLORIDE: 9 INJECTION, SOLUTION INTRAVENOUS at 17:04

## 2018-08-18 RX ADMIN — Medication 10 ML: at 08:41

## 2018-08-18 RX ADMIN — LISINOPRIL 10 MG: 10 TABLET ORAL at 08:40

## 2018-08-18 RX ADMIN — Medication 2 G: at 08:41

## 2018-08-18 RX ADMIN — MEROPENEM 1 G: 1 INJECTION, POWDER, FOR SOLUTION INTRAVENOUS at 05:05

## 2018-08-18 RX ADMIN — TAMSULOSIN HYDROCHLORIDE 0.8 MG: 0.4 CAPSULE ORAL at 08:40

## 2018-08-18 RX ADMIN — ENOXAPARIN SODIUM 30 MG: 30 INJECTION SUBCUTANEOUS at 16:05

## 2018-08-18 RX ADMIN — AMLODIPINE BESYLATE 10 MG: 10 TABLET ORAL at 08:40

## 2018-08-18 RX ADMIN — ATORVASTATIN CALCIUM 10 MG: 10 TABLET, FILM COATED ORAL at 08:40

## 2018-08-18 RX ADMIN — FINASTERIDE 5 MG: 5 TABLET, FILM COATED ORAL at 08:40

## 2018-08-18 RX ADMIN — FAMOTIDINE 20 MG: 20 TABLET ORAL at 20:01

## 2018-08-18 RX ADMIN — KETOROLAC TROMETHAMINE 15 MG: 30 INJECTION, SOLUTION INTRAMUSCULAR; INTRAVENOUS at 17:06

## 2018-08-18 RX ADMIN — KETOROLAC TROMETHAMINE 15 MG: 30 INJECTION, SOLUTION INTRAMUSCULAR; INTRAVENOUS at 09:22

## 2018-08-18 RX ADMIN — FAMOTIDINE 20 MG: 20 TABLET ORAL at 08:40

## 2018-08-18 RX ADMIN — FERROUS GLUCONATE TAB 324 MG (37.5 MG ELEMENTAL IRON) 324 MG: 324 (37.5 FE) TAB at 08:40

## 2018-08-18 RX ADMIN — Medication 2 G: at 16:35

## 2018-08-18 ASSESSMENT — PAIN DESCRIPTION - LOCATION: LOCATION: SCROTUM

## 2018-08-18 ASSESSMENT — PAIN DESCRIPTION - DESCRIPTORS: DESCRIPTORS: DISCOMFORT;PINS AND NEEDLES

## 2018-08-18 ASSESSMENT — PAIN DESCRIPTION - FREQUENCY: FREQUENCY: INTERMITTENT

## 2018-08-18 ASSESSMENT — PAIN SCALES - GENERAL
PAINLEVEL_OUTOF10: 0
PAINLEVEL_OUTOF10: 8
PAINLEVEL_OUTOF10: 7
PAINLEVEL_OUTOF10: 6

## 2018-08-18 ASSESSMENT — PAIN DESCRIPTION - PROGRESSION: CLINICAL_PROGRESSION: GRADUALLY WORSENING

## 2018-08-18 ASSESSMENT — ENCOUNTER SYMPTOMS
EYES NEGATIVE: 1
RESPIRATORY NEGATIVE: 1
VOMITING: 0
GASTROINTESTINAL NEGATIVE: 1

## 2018-08-18 ASSESSMENT — PAIN DESCRIPTION - ONSET: ONSET: GRADUAL

## 2018-08-18 ASSESSMENT — PAIN DESCRIPTION - PAIN TYPE: TYPE: ACUTE PAIN

## 2018-08-18 NOTE — PROGRESS NOTES
INFECTIOUS DISEASES PROGRESS NOTE    Patient:  Alex Higginbotham  YOB: 1960  MRN: [id number]   Admit date: 8/16/2018   Admitting Physician: Octavio Gonzalez MD  Primary Care Physician: Unspecified [facility]    CHIEF COMPLAINT: fever and chills      Interval History:  Patient moved from the ICU and per usual, I was not notified of move. I reviewed [facility] SONALI data this morning and noted the Pseudomonas was resistant only to meropenem and changed him to cefepime. I called ELIZABETH Dsouza to tell her of change. No fax from [facility] with their updated culture. It doesn't appear that a call was placed to them either. Received one time dose of levofloxacin  8/17/ evening       He does report that he has some lower abdominal pain, some back pain  That is better. Still with  testicular edema and pain. He reports elevation of the scrotum has improved his testicular pain however, towel was removed eariler    Allergies:    Allergies   Allergen Reactions    Lipitor [Atorvastatin] Shortness Of Breath     Chest pain    Ibuprofen      Elevates his liver enzymes    Lactose Intolerance (Gi)        Current Meds:     famotidine (PEPCID) tablet 20 mg BID   ceFEPIme (MAXIPIME) 2 g in sodium chloride (PF) 20 mL IV syringe Q8H   ketorolac (TORADOL) injection 15 mg Q8H   morphine (PF) injection 2 mg Q2H PRN   oxyCODONE-acetaminophen (PERCOCET) 5-325 MG per tablet 2 tablet Q4H PRN   sodium chloride flush 0.9 % injection 10 mL 2 times per day   sodium chloride flush 0.9 % injection 10 mL PRN   acetaminophen (TYLENOL) tablet 650 mg Q4H PRN   amLODIPine (NORVASC) tablet 10 mg Daily   finasteride (PROSCAR) tablet 5 mg Daily   lisinopril (PRINIVIL;ZESTRIL) tablet 10 mg Daily   0.9 % sodium chloride infusion Continuous   magnesium hydroxide (MILK OF MAGNESIA) 400 MG/5ML suspension 30 mL Daily PRN   ondansetron (ZOFRAN) injection 4 mg Q4H PRN   tamsulosin (FLOMAX) capsule 0.8 mg Daily   enoxaparin (LOVENOX) injection 30 mg Q24H

## 2018-08-19 LAB
ALBUMIN SERPL-MCNC: 2.3 G/DL (ref 3.5–5.2)
ALP BLD-CCNC: 53 U/L (ref 40–130)
ALT SERPL-CCNC: 11 U/L (ref 5–41)
ANION GAP SERPL CALCULATED.3IONS-SCNC: 12 MMOL/L (ref 7–19)
AST SERPL-CCNC: 11 U/L (ref 5–40)
BANDED NEUTROPHILS RELATIVE PERCENT: 1 % (ref 0–5)
BASOPHILS ABSOLUTE: 0.1 K/UL (ref 0–0.2)
BASOPHILS MANUAL: 1 %
BASOPHILS RELATIVE PERCENT: 1 % (ref 0–1)
BILIRUB SERPL-MCNC: 0.3 MG/DL (ref 0.2–1.2)
BUN BLDV-MCNC: 17 MG/DL (ref 6–20)
CALCIUM SERPL-MCNC: 7.8 MG/DL (ref 8.6–10)
CHLORIDE BLD-SCNC: 105 MMOL/L (ref 98–111)
CO2: 20 MMOL/L (ref 22–29)
CREAT SERPL-MCNC: 0.9 MG/DL (ref 0.5–1.2)
EOSINOPHILS ABSOLUTE: 0 K/UL (ref 0–0.6)
EOSINOPHILS RELATIVE PERCENT: 0 % (ref 0–5)
GFR NON-AFRICAN AMERICAN: >60
GLUCOSE BLD-MCNC: 167 MG/DL (ref 70–99)
GLUCOSE BLD-MCNC: 98 MG/DL (ref 74–109)
HCT VFR BLD CALC: 27.6 % (ref 42–52)
HEMOGLOBIN: 8.9 G/DL (ref 14–18)
HYPOCHROMIA: ABNORMAL
LYMPHOCYTES ABSOLUTE: 2.2 K/UL (ref 1.1–4.5)
LYMPHOCYTES RELATIVE PERCENT: 15 % (ref 20–40)
MCH RBC QN AUTO: 26.6 PG (ref 27–31)
MCHC RBC AUTO-ENTMCNC: 32.2 G/DL (ref 33–37)
MCV RBC AUTO: 82.6 FL (ref 80–94)
MONOCYTES ABSOLUTE: 0.6 K/UL (ref 0–0.9)
MONOCYTES RELATIVE PERCENT: 4 % (ref 0–10)
NEUTROPHILS ABSOLUTE: 11.7 K/UL (ref 1.5–7.5)
NEUTROPHILS MANUAL: 79 %
NEUTROPHILS RELATIVE PERCENT: 79 % (ref 50–65)
OVALOCYTES: ABNORMAL
PDW BLD-RTO: 13.4 % (ref 11.5–14.5)
PERFORMED ON: ABNORMAL
PLATELET # BLD: 273 K/UL (ref 130–400)
PLATELET SLIDE REVIEW: ADEQUATE
PMV BLD AUTO: 10.1 FL (ref 9.4–12.4)
POLYCHROMASIA: ABNORMAL
POTASSIUM REFLEX MAGNESIUM: 3.6 MMOL/L (ref 3.5–5)
POTASSIUM SERPL-SCNC: 3.6 MMOL/L (ref 3.5–5)
RBC # BLD: 3.34 M/UL (ref 4.7–6.1)
SODIUM BLD-SCNC: 137 MMOL/L (ref 136–145)
TOTAL PROTEIN: 5.3 G/DL (ref 6.6–8.7)
WBC # BLD: 14.6 K/UL (ref 4.8–10.8)

## 2018-08-19 PROCEDURE — 2580000003 HC RX 258: Performed by: UROLOGY

## 2018-08-19 PROCEDURE — 6370000000 HC RX 637 (ALT 250 FOR IP): Performed by: UROLOGY

## 2018-08-19 PROCEDURE — 80053 COMPREHEN METABOLIC PANEL: CPT

## 2018-08-19 PROCEDURE — 85025 COMPLETE CBC W/AUTO DIFF WBC: CPT

## 2018-08-19 PROCEDURE — 36415 COLL VENOUS BLD VENIPUNCTURE: CPT

## 2018-08-19 PROCEDURE — 99232 SBSQ HOSP IP/OBS MODERATE 35: CPT | Performed by: UROLOGY

## 2018-08-19 PROCEDURE — 6360000002 HC RX W HCPCS: Performed by: UROLOGY

## 2018-08-19 PROCEDURE — 6360000002 HC RX W HCPCS: Performed by: INTERNAL MEDICINE

## 2018-08-19 PROCEDURE — 82948 REAGENT STRIP/BLOOD GLUCOSE: CPT

## 2018-08-19 PROCEDURE — 2580000003 HC RX 258: Performed by: INTERNAL MEDICINE

## 2018-08-19 PROCEDURE — 1210000000 HC MED SURG R&B

## 2018-08-19 RX ADMIN — FAMOTIDINE 20 MG: 20 TABLET ORAL at 21:04

## 2018-08-19 RX ADMIN — AMLODIPINE BESYLATE 10 MG: 10 TABLET ORAL at 08:32

## 2018-08-19 RX ADMIN — SODIUM CHLORIDE: 9 INJECTION, SOLUTION INTRAVENOUS at 16:28

## 2018-08-19 RX ADMIN — KETOROLAC TROMETHAMINE 15 MG: 30 INJECTION, SOLUTION INTRAMUSCULAR; INTRAVENOUS at 08:32

## 2018-08-19 RX ADMIN — SODIUM CHLORIDE: 9 INJECTION, SOLUTION INTRAVENOUS at 08:32

## 2018-08-19 RX ADMIN — SODIUM CHLORIDE: 9 INJECTION, SOLUTION INTRAVENOUS at 00:04

## 2018-08-19 RX ADMIN — Medication 2 G: at 08:32

## 2018-08-19 RX ADMIN — KETOROLAC TROMETHAMINE 15 MG: 30 INJECTION, SOLUTION INTRAMUSCULAR; INTRAVENOUS at 01:22

## 2018-08-19 RX ADMIN — Medication 2 G: at 16:28

## 2018-08-19 RX ADMIN — FAMOTIDINE 20 MG: 20 TABLET ORAL at 08:31

## 2018-08-19 RX ADMIN — TAMSULOSIN HYDROCHLORIDE 0.8 MG: 0.4 CAPSULE ORAL at 08:32

## 2018-08-19 RX ADMIN — FINASTERIDE 5 MG: 5 TABLET, FILM COATED ORAL at 08:32

## 2018-08-19 RX ADMIN — LISINOPRIL 10 MG: 10 TABLET ORAL at 08:32

## 2018-08-19 RX ADMIN — Medication 2 G: at 00:04

## 2018-08-19 RX ADMIN — Medication 10 ML: at 08:32

## 2018-08-19 RX ADMIN — ENOXAPARIN SODIUM 30 MG: 30 INJECTION SUBCUTANEOUS at 14:45

## 2018-08-19 RX ADMIN — KETOROLAC TROMETHAMINE 15 MG: 30 INJECTION, SOLUTION INTRAMUSCULAR; INTRAVENOUS at 16:27

## 2018-08-19 ASSESSMENT — ENCOUNTER SYMPTOMS
EYES NEGATIVE: 1
VOMITING: 0
RESPIRATORY NEGATIVE: 1
GASTROINTESTINAL NEGATIVE: 1

## 2018-08-19 ASSESSMENT — PAIN SCALES - GENERAL
PAINLEVEL_OUTOF10: 4
PAINLEVEL_OUTOF10: 6
PAINLEVEL_OUTOF10: 4

## 2018-08-19 ASSESSMENT — PAIN DESCRIPTION - PAIN TYPE: TYPE: ACUTE PAIN

## 2018-08-19 ASSESSMENT — PAIN DESCRIPTION - LOCATION: LOCATION: OTHER (COMMENT)

## 2018-08-20 LAB
ALBUMIN SERPL-MCNC: 2.5 G/DL (ref 3.5–5.2)
ALP BLD-CCNC: 54 U/L (ref 40–130)
ALT SERPL-CCNC: 23 U/L (ref 5–41)
ANION GAP SERPL CALCULATED.3IONS-SCNC: 11 MMOL/L (ref 7–19)
AST SERPL-CCNC: 21 U/L (ref 5–40)
BASOPHILS ABSOLUTE: 0.1 K/UL (ref 0–0.2)
BASOPHILS RELATIVE PERCENT: 0.7 % (ref 0–1)
BILIRUB SERPL-MCNC: <0.2 MG/DL (ref 0.2–1.2)
BUN BLDV-MCNC: 11 MG/DL (ref 6–20)
CALCIUM SERPL-MCNC: 8.1 MG/DL (ref 8.6–10)
CHLORIDE BLD-SCNC: 105 MMOL/L (ref 98–111)
CO2: 24 MMOL/L (ref 22–29)
CREAT SERPL-MCNC: 0.8 MG/DL (ref 0.5–1.2)
EOSINOPHILS ABSOLUTE: 0.1 K/UL (ref 0–0.6)
EOSINOPHILS RELATIVE PERCENT: 1.7 % (ref 0–5)
FERRITIN: 141.3 NG/ML (ref 30–400)
FOLATE: 4.8 NG/ML (ref 4.5–32.2)
GFR NON-AFRICAN AMERICAN: >60
GLUCOSE BLD-MCNC: 94 MG/DL (ref 74–109)
HCT VFR BLD CALC: 26.9 % (ref 42–52)
HCT VFR BLD CALC: 28.1 % (ref 42–52)
HEMOGLOBIN: 8.9 G/DL (ref 14–18)
IRON SATURATION: 20 % (ref 14–50)
IRON: 41 UG/DL (ref 59–158)
LACTATE DEHYDROGENASE: 122 U/L (ref 91–215)
LYMPHOCYTES ABSOLUTE: 1.5 K/UL (ref 1.1–4.5)
LYMPHOCYTES RELATIVE PERCENT: 20.5 % (ref 20–40)
MAGNESIUM: 2 MG/DL (ref 1.6–2.6)
MCH RBC QN AUTO: 27.1 PG (ref 27–31)
MCHC RBC AUTO-ENTMCNC: 33.1 G/DL (ref 33–37)
MCV RBC AUTO: 82 FL (ref 80–94)
MONOCYTES ABSOLUTE: 0.9 K/UL (ref 0–0.9)
MONOCYTES RELATIVE PERCENT: 11.6 % (ref 0–10)
NEUTROPHILS ABSOLUTE: 4.8 K/UL (ref 1.5–7.5)
NEUTROPHILS RELATIVE PERCENT: 63.9 % (ref 50–65)
PDW BLD-RTO: 13.3 % (ref 11.5–14.5)
PLATELET # BLD: 302 K/UL (ref 130–400)
PMV BLD AUTO: 10.2 FL (ref 9.4–12.4)
POTASSIUM REFLEX MAGNESIUM: 3.4 MMOL/L (ref 3.5–5)
POTASSIUM SERPL-SCNC: 3.4 MMOL/L (ref 3.5–5)
RAPID HIV 1&2: NORMAL
RBC # BLD: 3.28 M/UL (ref 4.7–6.1)
RETICULOCYTE ABSOLUTE COUNT: 0.01 M/UL (ref 0.03–0.12)
RETICULOCYTE COUNT PCT: 0.39 % (ref 0.5–1.5)
SODIUM BLD-SCNC: 140 MMOL/L (ref 136–145)
TOTAL IRON BINDING CAPACITY: 210 UG/DL (ref 250–400)
TOTAL PROTEIN: 5.8 G/DL (ref 6.6–8.7)
VITAMIN B-12: 817 PG/ML (ref 211–946)
WBC # BLD: 7.5 K/UL (ref 4.8–10.8)

## 2018-08-20 PROCEDURE — 1210000000 HC MED SURG R&B

## 2018-08-20 PROCEDURE — 2580000003 HC RX 258: Performed by: UROLOGY

## 2018-08-20 PROCEDURE — 85045 AUTOMATED RETICULOCYTE COUNT: CPT

## 2018-08-20 PROCEDURE — 84165 PROTEIN E-PHORESIS SERUM: CPT

## 2018-08-20 PROCEDURE — 82746 ASSAY OF FOLIC ACID SERUM: CPT

## 2018-08-20 PROCEDURE — 82668 ASSAY OF ERYTHROPOIETIN: CPT

## 2018-08-20 PROCEDURE — 6360000002 HC RX W HCPCS: Performed by: INTERNAL MEDICINE

## 2018-08-20 PROCEDURE — 2580000003 HC RX 258: Performed by: INTERNAL MEDICINE

## 2018-08-20 PROCEDURE — 84160 ASSAY OF PROTEIN ANY SOURCE: CPT

## 2018-08-20 PROCEDURE — 6360000002 HC RX W HCPCS: Performed by: UROLOGY

## 2018-08-20 PROCEDURE — 83615 LACTATE (LD) (LDH) ENZYME: CPT

## 2018-08-20 PROCEDURE — 82728 ASSAY OF FERRITIN: CPT

## 2018-08-20 PROCEDURE — 83550 IRON BINDING TEST: CPT

## 2018-08-20 PROCEDURE — 36415 COLL VENOUS BLD VENIPUNCTURE: CPT

## 2018-08-20 PROCEDURE — 83540 ASSAY OF IRON: CPT

## 2018-08-20 PROCEDURE — 6370000000 HC RX 637 (ALT 250 FOR IP): Performed by: UROLOGY

## 2018-08-20 PROCEDURE — 80053 COMPREHEN METABOLIC PANEL: CPT

## 2018-08-20 PROCEDURE — 82784 ASSAY IGA/IGD/IGG/IGM EACH: CPT

## 2018-08-20 PROCEDURE — 83883 ASSAY NEPHELOMETRY NOT SPEC: CPT

## 2018-08-20 PROCEDURE — 6370000000 HC RX 637 (ALT 250 FOR IP): Performed by: INTERNAL MEDICINE

## 2018-08-20 PROCEDURE — 82607 VITAMIN B-12: CPT

## 2018-08-20 PROCEDURE — 99232 SBSQ HOSP IP/OBS MODERATE 35: CPT | Performed by: UROLOGY

## 2018-08-20 PROCEDURE — 86701 HIV-1ANTIBODY: CPT

## 2018-08-20 PROCEDURE — 83735 ASSAY OF MAGNESIUM: CPT

## 2018-08-20 PROCEDURE — 85025 COMPLETE CBC W/AUTO DIFF WBC: CPT

## 2018-08-20 RX ORDER — POTASSIUM CHLORIDE 20MEQ/15ML
40 LIQUID (ML) ORAL 2 TIMES DAILY
Status: COMPLETED | OUTPATIENT
Start: 2018-08-20 | End: 2018-08-20

## 2018-08-20 RX ORDER — LACTOBACILLUS RHAMNOSUS GG 10B CELL
1 CAPSULE ORAL DAILY
Status: DISCONTINUED | OUTPATIENT
Start: 2018-08-20 | End: 2018-08-23 | Stop reason: HOSPADM

## 2018-08-20 RX ADMIN — Medication 2 G: at 18:10

## 2018-08-20 RX ADMIN — KETOROLAC TROMETHAMINE 15 MG: 30 INJECTION, SOLUTION INTRAMUSCULAR; INTRAVENOUS at 01:00

## 2018-08-20 RX ADMIN — ENOXAPARIN SODIUM 40 MG: 40 INJECTION SUBCUTANEOUS at 18:09

## 2018-08-20 RX ADMIN — FINASTERIDE 5 MG: 5 TABLET, FILM COATED ORAL at 07:58

## 2018-08-20 RX ADMIN — TAMSULOSIN HYDROCHLORIDE 0.8 MG: 0.4 CAPSULE ORAL at 07:58

## 2018-08-20 RX ADMIN — KETOROLAC TROMETHAMINE 15 MG: 30 INJECTION, SOLUTION INTRAMUSCULAR; INTRAVENOUS at 18:09

## 2018-08-20 RX ADMIN — LISINOPRIL 10 MG: 10 TABLET ORAL at 07:58

## 2018-08-20 RX ADMIN — POTASSIUM CHLORIDE 40 MEQ: 20 SOLUTION ORAL at 10:22

## 2018-08-20 RX ADMIN — SODIUM CHLORIDE: 9 INJECTION, SOLUTION INTRAVENOUS at 10:12

## 2018-08-20 RX ADMIN — Medication 10 ML: at 08:00

## 2018-08-20 RX ADMIN — Medication 1 CAPSULE: at 12:57

## 2018-08-20 RX ADMIN — FAMOTIDINE 20 MG: 20 TABLET ORAL at 21:12

## 2018-08-20 RX ADMIN — POTASSIUM CHLORIDE 40 MEQ: 20 SOLUTION ORAL at 21:12

## 2018-08-20 RX ADMIN — Medication 2 G: at 08:00

## 2018-08-20 RX ADMIN — KETOROLAC TROMETHAMINE 15 MG: 30 INJECTION, SOLUTION INTRAMUSCULAR; INTRAVENOUS at 07:59

## 2018-08-20 RX ADMIN — AMLODIPINE BESYLATE 10 MG: 10 TABLET ORAL at 07:59

## 2018-08-20 RX ADMIN — FAMOTIDINE 20 MG: 20 TABLET ORAL at 07:59

## 2018-08-20 RX ADMIN — Medication 2 G: at 01:00

## 2018-08-20 RX ADMIN — SODIUM CHLORIDE: 9 INJECTION, SOLUTION INTRAVENOUS at 01:41

## 2018-08-20 ASSESSMENT — PAIN SCALES - GENERAL
PAINLEVEL_OUTOF10: 1
PAINLEVEL_OUTOF10: 6
PAINLEVEL_OUTOF10: 4

## 2018-08-20 ASSESSMENT — ENCOUNTER SYMPTOMS: VOMITING: 0

## 2018-08-20 NOTE — PLAN OF CARE
Problem: Falls - Risk of:  Goal: Will remain free from falls  Will remain free from falls   Outcome: Ongoing    Goal: Absence of physical injury  Absence of physical injury   Outcome: Ongoing      Problem: Urinary Elimination:  Goal: Complications related to the disease process, condition or treatment will be avoided or minimized  Outcome: Ongoing      Problem: Pain:  Goal: Pain level will decrease  Pain level will decrease   Outcome: Ongoing    Goal: Control of acute pain  Control of acute pain   Outcome: Ongoing    Goal: Control of chronic pain  Control of chronic pain   Outcome: Ongoing

## 2018-08-20 NOTE — PROGRESS NOTES
INFECTIOUS DISEASES PROGRESS NOTE    Patient:  Jake Worthington  YOB: 1960  MRN: [id number]   Admit date: 2018   Admitting Physician: Paula Lu MD  Primary Care Physician: Unspecified [facility]    CHIEF COMPLAINT: fever and chills      Interval History:  Patient reports he thinks he is overall better. Still with scrotal edema    Urine cultures from [facility] - Pseudomonas resistant only to imipenem  No rash. No diarrhea      Received one time dose of levofloxacin   evening       Allergies: Allergies   Allergen Reactions    Lipitor [Atorvastatin] Shortness Of Breath     Chest pain    Ibuprofen      Elevates his liver enzymes    Lactose Intolerance (Gi)        Current Meds:     famotidine (PEPCID) tablet 20 mg BID   ceFEPIme (MAXIPIME) 2 g in sodium chloride (PF) 20 mL IV syringe Q8H   ketorolac (TORADOL) injection 15 mg Q8H   morphine (PF) injection 2 mg Q2H PRN   oxyCODONE-acetaminophen (PERCOCET) 5-325 MG per tablet 2 tablet Q4H PRN   sodium chloride flush 0.9 % injection 10 mL 2 times per day   sodium chloride flush 0.9 % injection 10 mL PRN   acetaminophen (TYLENOL) tablet 650 mg Q4H PRN   amLODIPine (NORVASC) tablet 10 mg Daily   finasteride (PROSCAR) tablet 5 mg Daily   lisinopril (PRINIVIL;ZESTRIL) tablet 10 mg Daily   0.9 % sodium chloride infusion Continuous   magnesium hydroxide (MILK OF MAGNESIA) 400 MG/5ML suspension 30 mL Daily PRN   ondansetron (ZOFRAN) injection 4 mg Q4H PRN   tamsulosin (FLOMAX) capsule 0.8 mg Daily   enoxaparin (LOVENOX) injection 30 mg Q24H   meperidine (DEMEROL) injection 6.5 mg BID PRN       Review of Systems   Constitutional: Positive for fever (low grade). Negative for chills. Gastrointestinal: Negative for vomiting.        Vital Signs:  BP (!) 154/93   Pulse 77   Temp 98.8 °F (37.1 °C)   Resp 16   Ht 6' 1\" (1.854 m)   Wt 216 lb 9.6 oz (98.2 kg)   SpO2 100%   BMI 28.58 kg/m²   Temp (24hrs), Av.6 °F (37 °C), Min:98.1 °F (36.7 °C), Max:99.3 °F (37.4 °C)      Physical Exam   Gen. : The patient's nontoxic-appearing lying in bed   HEENT: Sclerae anicteric and noninjected  Neck: Supple  Lungs: Clear but diminished bilaterally  Abdomen: Soft, BS positive, no tenderness suprapubic region  Extremities: no periferal edema. Psych: He is pleasant and cooperative. Flat affect  Neuro: He is alert and oriented, speech is clear, moves extremities without difficulty    Line/IV site: No erythema, warmth, induration, or tenderness. LAB RESULTS:    CBC with DIFF:   Recent Labs      08/18/18 0128  08/19/18 0208  08/20/18 0211   WBC  25.8*  14.6*  7.5   RBC  3.38*  3.34*  3.28*   HGB  9.3*  8.9*  8.9*   HCT  28.0*  27.6*  26.9*   MCV  82.8  82.6  82.0   MCH  27.5  26.6*  27.1   MCHC  33.2  32.2*  33.1   RDW  13.4  13.4  13.3   PLT  264  273  302   MPV  9.8  10.1  10.2   NEUTOPHILPCT  93.0*  79.0*  63.9   LYMPHOPCT  1.0*  15.0*  20.5   MONOPCT  2.0  4.0  11.6*   EOSRELPCT  0.0  0.0  1.7   BASOPCT  0.0  1.0  0.7   NEUTROABS  25.0*  11.7*  4.8   LYMPHSABS  0.3*  2.2  1.5   MONOSABS  0.50  0.60  0.90   EOSABS  0.00  0.00  0.10   BASOSABS  0.00  0.10  0.10       CMP/BMP:  Recent Labs      08/18/18 0128 08/19/18 0208 08/20/18   0211   NA  135*  137  140   K  3.6  3.6  3.6  3.4*  3.4*   CL  104  105  105   CO2  21*  20*  24   ANIONGAP  10  12  11   GLUCOSE  121*  98  94   BUN  22*  17  11   CREATININE  1.2  0.9  0.8   LABGLOM  >60  >60  >60   CALCIUM  8.3*  7.8*  8.1*   PROT   --   5.3*  5.8*   LABALBU   --   2.3*  2.5*   BILITOT   --   0.3  <0.2   ALKPHOS   --   53  54   ALT   --   11  23   AST   --   11  21             UA:   No results for input(s): SPECGRAV, PHUR, COLORU, CLARITYU, MUCUS, PROTEINU, BLOODU, RBCUA, WBCUA, BACTERIA, NITRU, GLUCOSEU, BILIRUBINUR, UROBILINOGEN, KETUA, LABCAST, LABCASTTY, AMORPHOS in the last 72 hours.     Invalid input(s): CRYSTALS    Culture Results:    ORDER#: [id number]                          ORDERED BY: Marjorie Vazquez  SOURCE: measuring 1.7 cm on  axial series 2 image 55 and coronal series 3 image 44. No abnormally  dilated loops of bowel or bowel wall thickening. Large amount of  colonic stool. Normal caliber appendix on axial images 92-1 11. RETROPERITONEUM:   The abdominal aorta is normal in course and  caliber.  Mild calcified aortoiliac atherosclerosis. No abdominal or  pelvic adenopathy. PERITONEUM: No ascites/free fluid. No pneumoperitoneum. ABDOMINAL WALL: No acute findings. BONES: Degenerative changes in the visualized spine including  levocurvature centered at L2-3. No acute bony findings.     Impression:       1. Urinary bladder wall thickening, may be due to bladder outlet  obstruction. Infiltrative malignancy not excluded. 2. Enlarged prostate fissures 6 cm in transverse dimension and indents  the base of the urinary bladder. 3. No hydronephrosis. There is a nonobstructing 9 mm left renal  calculus. Signed by Dr Alma Ivan on 8/16/2018 12:40 PM           Patient Active Problem List   Diagnosis    Renal calculus, left    BPH with obstruction/lower urinary tract symptoms    Sepsis ([facility] [address]       IMPRESSION:  · Sepsis secondary to urinary source. Cultures at the correctional facility positive for Pseudomonas as well as urine culture here. The correctional facility did not obtain blood cultures.   · Fever-overall temperature curve significantly improved  ·  Leukocytosis-resolved  · Acute kidney injury-resolved  · Nonobstructing left renal stone    RECOMMENDATIONS :  · Continue cefepime at 2 gm q 8 - pseudomonas dosing  · Continue elevation of scrotum        Lady Parker MD

## 2018-08-21 LAB
ALBUMIN SERPL-MCNC: 2.7 G/DL (ref 3.5–5.2)
ALP BLD-CCNC: 55 U/L (ref 40–130)
ALT SERPL-CCNC: 42 U/L (ref 5–41)
ANION GAP SERPL CALCULATED.3IONS-SCNC: 12 MMOL/L (ref 7–19)
AST SERPL-CCNC: 30 U/L (ref 5–40)
BANDED NEUTROPHILS RELATIVE PERCENT: 1 % (ref 0–5)
BASOPHILS ABSOLUTE: 0 K/UL (ref 0–0.2)
BASOPHILS MANUAL: 0 %
BASOPHILS RELATIVE PERCENT: 0 % (ref 0–1)
BILIRUB SERPL-MCNC: <0.2 MG/DL (ref 0.2–1.2)
BLOOD CULTURE, ROUTINE: NORMAL
BUN BLDV-MCNC: 9 MG/DL (ref 6–20)
CALCIUM SERPL-MCNC: 8.3 MG/DL (ref 8.6–10)
CHLORIDE BLD-SCNC: 104 MMOL/L (ref 98–111)
CO2: 23 MMOL/L (ref 22–29)
CREAT SERPL-MCNC: 0.8 MG/DL (ref 0.5–1.2)
CULTURE, BLOOD 2: NORMAL
EOSINOPHILS ABSOLUTE: 0.25 K/UL (ref 0–0.6)
EOSINOPHILS RELATIVE PERCENT: 3 % (ref 0–5)
GFR NON-AFRICAN AMERICAN: >60
GLUCOSE BLD-MCNC: 106 MG/DL (ref 74–109)
HCT VFR BLD CALC: 28 % (ref 42–52)
HEMOGLOBIN: 9.1 G/DL (ref 14–18)
LYMPHOCYTES ABSOLUTE: 2.1 K/UL (ref 1.1–4.5)
LYMPHOCYTES RELATIVE PERCENT: 25 % (ref 20–40)
MCH RBC QN AUTO: 27.1 PG (ref 27–31)
MCHC RBC AUTO-ENTMCNC: 32.5 G/DL (ref 33–37)
MCV RBC AUTO: 83.3 FL (ref 80–94)
MONOCYTES ABSOLUTE: 0.3 K/UL (ref 0–0.9)
MONOCYTES RELATIVE PERCENT: 3 % (ref 0–10)
NEUTROPHILS ABSOLUTE: 5.8 K/UL (ref 1.5–7.5)
NEUTROPHILS MANUAL: 68 %
NEUTROPHILS RELATIVE PERCENT: 68 % (ref 50–65)
OVALOCYTES: ABNORMAL
PDW BLD-RTO: 13.5 % (ref 11.5–14.5)
PLATELET # BLD: 325 K/UL (ref 130–400)
PLATELET SLIDE REVIEW: ADEQUATE
PMV BLD AUTO: 9.4 FL (ref 9.4–12.4)
POTASSIUM REFLEX MAGNESIUM: 3.9 MMOL/L (ref 3.5–5)
POTASSIUM SERPL-SCNC: 3.9 MMOL/L (ref 3.5–5)
RBC # BLD: 3.36 M/UL (ref 4.7–6.1)
SLIDE REVIEW: ABNORMAL
SODIUM BLD-SCNC: 139 MMOL/L (ref 136–145)
TOTAL PROTEIN: 6 G/DL (ref 6.6–8.7)
TSH SERPL DL<=0.05 MIU/L-ACNC: 1.73 UIU/ML (ref 0.27–4.2)
WBC # BLD: 8.4 K/UL (ref 4.8–10.8)

## 2018-08-21 PROCEDURE — 1210000000 HC MED SURG R&B

## 2018-08-21 PROCEDURE — 85025 COMPLETE CBC W/AUTO DIFF WBC: CPT

## 2018-08-21 PROCEDURE — 6360000002 HC RX W HCPCS: Performed by: NURSE PRACTITIONER

## 2018-08-21 PROCEDURE — 2580000003 HC RX 258: Performed by: UROLOGY

## 2018-08-21 PROCEDURE — 6370000000 HC RX 637 (ALT 250 FOR IP): Performed by: INTERNAL MEDICINE

## 2018-08-21 PROCEDURE — 6370000000 HC RX 637 (ALT 250 FOR IP): Performed by: UROLOGY

## 2018-08-21 PROCEDURE — 99232 SBSQ HOSP IP/OBS MODERATE 35: CPT | Performed by: UROLOGY

## 2018-08-21 PROCEDURE — 36415 COLL VENOUS BLD VENIPUNCTURE: CPT

## 2018-08-21 PROCEDURE — 6360000002 HC RX W HCPCS: Performed by: UROLOGY

## 2018-08-21 PROCEDURE — 84443 ASSAY THYROID STIM HORMONE: CPT

## 2018-08-21 PROCEDURE — 6360000002 HC RX W HCPCS: Performed by: INTERNAL MEDICINE

## 2018-08-21 PROCEDURE — 99223 1ST HOSP IP/OBS HIGH 75: CPT | Performed by: INTERNAL MEDICINE

## 2018-08-21 PROCEDURE — 80053 COMPREHEN METABOLIC PANEL: CPT

## 2018-08-21 PROCEDURE — 2580000003 HC RX 258: Performed by: NURSE PRACTITIONER

## 2018-08-21 PROCEDURE — 2580000003 HC RX 258: Performed by: INTERNAL MEDICINE

## 2018-08-21 RX ADMIN — LISINOPRIL 10 MG: 10 TABLET ORAL at 08:51

## 2018-08-21 RX ADMIN — FINASTERIDE 5 MG: 5 TABLET, FILM COATED ORAL at 08:51

## 2018-08-21 RX ADMIN — AMLODIPINE BESYLATE 10 MG: 10 TABLET ORAL at 08:51

## 2018-08-21 RX ADMIN — Medication 2 G: at 15:46

## 2018-08-21 RX ADMIN — SODIUM CHLORIDE: 9 INJECTION, SOLUTION INTRAVENOUS at 02:57

## 2018-08-21 RX ADMIN — Medication 2 G: at 08:51

## 2018-08-21 RX ADMIN — Medication 2 G: at 00:34

## 2018-08-21 RX ADMIN — FAMOTIDINE 20 MG: 20 TABLET ORAL at 21:32

## 2018-08-21 RX ADMIN — IRON SUCROSE 300 MG: 20 INJECTION, SOLUTION INTRAVENOUS at 08:51

## 2018-08-21 RX ADMIN — Medication 1 CAPSULE: at 08:51

## 2018-08-21 RX ADMIN — SODIUM CHLORIDE: 9 INJECTION, SOLUTION INTRAVENOUS at 21:33

## 2018-08-21 RX ADMIN — KETOROLAC TROMETHAMINE 15 MG: 30 INJECTION, SOLUTION INTRAMUSCULAR; INTRAVENOUS at 00:34

## 2018-08-21 RX ADMIN — FAMOTIDINE 20 MG: 20 TABLET ORAL at 08:51

## 2018-08-21 RX ADMIN — ENOXAPARIN SODIUM 40 MG: 40 INJECTION SUBCUTANEOUS at 15:46

## 2018-08-21 RX ADMIN — TAMSULOSIN HYDROCHLORIDE 0.4 MG: 0.4 CAPSULE ORAL at 08:50

## 2018-08-21 ASSESSMENT — ENCOUNTER SYMPTOMS
CONSTIPATION: 0
RESPIRATORY NEGATIVE: 1
BLURRED VISION: 0
EYE REDNESS: 0
WHEEZING: 0
DIARRHEA: 0
HEARTBURN: 0
ABDOMINAL PAIN: 0
EYES NEGATIVE: 1
DOUBLE VISION: 0
SPUTUM PRODUCTION: 0
GASTROINTESTINAL NEGATIVE: 1
EYE PAIN: 0
BLOOD IN STOOL: 0
EYE DISCHARGE: 0
ORTHOPNEA: 0
SORE THROAT: 0
COUGH: 0
BACK PAIN: 0
SHORTNESS OF BREATH: 0
HEMOPTYSIS: 0
NAUSEA: 0
VOMITING: 0

## 2018-08-21 ASSESSMENT — PAIN SCALES - GENERAL: PAINLEVEL_OUTOF10: 2

## 2018-08-21 NOTE — PROGRESS NOTES
1\" (1.854 m)   Wt 216 lb 9.6 oz (98.2 kg)   SpO2 96%   BMI 28.58 kg/m²   Temp (24hrs), Av.6 °F (37 °C), Min:97.7 °F (36.5 °C), Max:99.4 °F (37.4 °C)      Physical Exam   Gen. : The patient's nontoxic-appearing sitting up in bed in NAD  HEENT: Sclerae anicteric and noninjected  Neck: Supple  RESPIRATORY: effort even and unlabored  Abdomen: Soft, BS positive, no focal  tenderness   Extremities: no periferal edema. Psych: He is pleasant and cooperative. More talkative   Neuro: He is alert and oriented, speech is clear, moves extremities without difficulty    Line/IV site: No erythema, warmth, induration, or tenderness.  Left wrist    LAB RESULTS:    CBC with DIFF:   Recent Labs      18   0208  18   0211  18   0948  18   0459   WBC  14.6*  7.5   --   8.4   RBC  3.34*  3.28*   --   3.36*   HGB  8.9*  8.9*   --   9.1*   HCT  27.6*  26.9*  28.1*  28.0*   MCV  82.6  82.0   --   83.3   MCH  26.6*  27.1   --   27.1   MCHC  32.2*  33.1   --   32.5*   RDW  13.4  13.3   --   13.5   PLT  273  302   --   325   MPV  10.1  10.2   --   9.4   NEUTOPHILPCT  79.0*  63.9   --   68.0*   LYMPHOPCT  15.0*  20.5   --   25.0   MONOPCT  4.0  11.6*   --   3.0   EOSRELPCT  0.0  1.7   --   3.0   BASOPCT  1.0  0.7   --   0.0   NEUTROABS  11.7*  4.8   --   5.8   LYMPHSABS  2.2  1.5   --   2.1   MONOSABS  0.60  0.90   --   0.30   EOSABS  0.00  0.10   --   0.25   BASOSABS  0.10  0.10   --   0.00       CMP/BMP:  Recent Labs      18   0208  18   0211  18   0459   NA  137  140  139   K  3.6  3.6  3.4*  3.4*  3.9  3.9   CL  105  105  104   CO2  20*  24  23   ANIONGAP  12  11  12   GLUCOSE  98  94  106   BUN  17  11  9   CREATININE  0.9  0.8  0.8   LABGLOM  >60  >60  >60   CALCIUM  7.8*  8.1*  8.3*   PROT  5.3*  5.8*  6.0*   LABALBU  2.3*  2.5*  2.7*   BILITOT  0.3  <0.2  <0.2   ALKPHOS  53  54  55   ALT  11  23  42*   AST  11  21  30             UA:   No results for input(s): SPECGRAV, PHUR, COLORU, CLARITYU, MUCUS, PROTEINU, BLOODU, RBCUA, WBCUA, BACTERIA, NITRU, GLUCOSEU, BILIRUBINUR, UROBILINOGEN, KETUA, LABCAST, LABCASTTY, AMORPHOS in the last 72 hours. Invalid input(s): CRYSTALS    Culture Results:    ORDER#: [id number]                          ORDERED BY: Andrés Melgar  SOURCE: Urine Clean Catch                  COLLECTED:  08/16/18 12:25  ANTIBIOTICS AT [facility]. :                      RECEIVED :  08/16/18 13:33   Culture & Susceptibility     PSEUDOMONAS AERUGINOSA     Antibiotic Interpretation SONALI Unit   cefepime Sensitive 2 mcg/mL   gentamicin Sensitive <=1 mcg/mL   levofloxacin Sensitive 2 mcg/mL   meropenem Resistant >=16 mcg/mL   piperacillin-tazobactam Sensitive 16 mcg/mL   tobramycin Sensitive <=1 mcg/mL        >>>>>>>>>>>>BLOOD cultures negative    >>>>>>>[facility] - urine culture with Pseudomonas - resistant to imipenem    RADIOLOGY    CT Kidney WO Contrast [[id number]] Resulted: 08/16/18 1340     Order Status: Completed Updated: 08/16/18 1242     Narrative:       EXAM: CT KIDNEY WO CONTRAST -- 8/16/2018 10:45 AM  HISTORY: 58 years, Male, flank pain, unable to urinate,   COMPARISON: None  DLP: 1330 mGy cm. TECHNIQUE: Unenhanced axial images of the abdomen and pelvis obtained  with coronal and sagittal reformats. FINDINGS: Evaluation limited secondary to lack of intravenous  contrast.   LUNG BASES: No acute findings of the lung bases. No inferior  pericardial or pleural effusion. LIVER: The liver is normal in size and contour. GALLBLADDER and BILLARY: The gallbladder is within normal limits. No  radiodense cholelithiasis.    PANCREAS: The pancreas is normal in noncontrasted CT appearance. SPLEEN: The spleen is normal in size. ADRENAL: No adrenal mass. GENITOURINARY:   No right hydronephrosis or urolithiasis. Left kidney with a 9 mm  calcification at the renal pelvis. Left kidney with an exophytic 0.9  cm round mass of intermediate density.   The urinary bladder is

## 2018-08-21 NOTE — CONSULTS
Consultation     Pt Name: Yani Hyde  MRN: [id number]  YOB: 1960  Date of evaluation: 8/20/2018      REASON FOR CONSULTATION:  Anemia    REQUESTING PHYSICIAN: Jacqueline Luu MD    ATTENDING/ADMITTING Jen Demarco MD     History Obtained From:  patient, electronic medical record    HISTORY OF PRESENT ILLNESS:    Mr. Yani Hyde is a 74-year-old gentleman that presented to Dr. Donaldson Cassette office on 8/13/2018 for gross hematuria and obstructive voiding symptoms. He had a cystoscopy on 8/13/2018 that revealed a large prostate with 3+ trilobar prostatic hyperplasia. Ronny Rodriguez was transported to [facility] ED on 8/16/2018 due to febrile illness, chills and hypotension and was admitted for treatment of urosepsis. Oncology consultation has been requested for further evaluation of normocytic anemia. Ronny Rodriguez has a known history of iron deficiency anemia and was taking oral iron replacement prior to admission. He reports have a colonoscopy without findings last year in [place], [place]. Ronny Rodriguez complains of always being cold. CBC on admission (8/16/2018) documented a hemoglobin of 10.2 and an MCV of 84.6. Ronny Rodriguez was also noted to have leukocytosis with neutrophilia secondary to acute illness at admission, his WBC and ANC have now normalized.      Past Medical History:    Past Medical History:   Diagnosis Date    Anemia     BPH (benign prostatic hyperplasia)     GERD (gastroesophageal reflux disease)     Gross hematuria     Hyperlipidemia     Hypertension     Renal calculi      Past Surgical History:    Past Surgical History:   Procedure Laterality Date    CYSTOSCOPY       Medications Prior to Admission:    Prescriptions Prior to Admission: amLODIPine (NORVASC) 10 MG tablet, Take 10 mg by mouth daily  finasteride (PROSCAR) 5 MG tablet, Take 5 mg by mouth daily  lisinopril (PRINIVIL;ZESTRIL) 10 MG tablet, Take 20 mg by mouth daily   tamsulosin (FLOMAX) 0.4 MG capsule, Take 1 capsule by
Denies eye pain, visual changes, double vision, blurred vision. EAR, NOSE, AND THROAT:  Denies ringing in ears, nose bleeds, sore throat,  pain with swallowing. CARDIOVASCULAR:  Denies chest pain, pain with exertion, palpitations,  fainting. RESPIRATORY:  Denies cough, wheezing, shortness of breath, hemoptysis. GASTROINTESTINAL:  Black stools since taking iron, no bright blood seen in  the stool. No hematemesis. URINARY:  Denies incontinence, frequency, urgency, nocturia, pain,  discomfort. MUSCULOSKELETAL:  Denies joint pain, stiffness, joint redness or swelling. NEUROLOGICAL:  Denies seizure, focal paralysis, numbness or tingling. HEMATOLOGIC/LYMPHATIC:  Denies known history of anemia, easy bleeding or  bruising, petechia. SKIN:  Denies itching, rashes, nodules, eczema. PHYSICAL EXAMINATION:  GENERAL:  On exam, he is a well-appearing 53-year-old male, in no acute  distress. VITAL SIGNS:  He is afebrile with a temperature of 97.7, pulse 75,  respirations 17, and blood pressure 161/93. HEENT:  Head is normocephalic, atraumatic. Pupils are equal, reactive to  light and accommodation. EOMs are intact. Conjunctiva is pink. Sclera is  nonicteric. NECK:  Supple without thyromegaly. Trachea is in the midline. There are  no carotid bruits. LUNGS:  Respiratory excursion is equal bilaterally. Lungs are clear to  auscultation bilaterally. ABDOMEN:  Soft with active bowel sounds. There are no masses. There are  no areas of tenderness. No rebound or guarding is noted. EXTREMITIES:  With no edema. There is some bruising on the anterior tibial  areas. SKIN:  Warm and dry without rashes. LABORATORY STUDIES:  Reveal a ferritin of 141 and iron of 41, TIBC of 210. Occult blood stool is pending. His sodium is 140, potassium is 3.4, BUN  11, and creatinine is 0.8. IMPRESSION:  1. Anemia, iron deficient with dark stools, on oral iron tablets. No  evidence of clinical bleeding otherwise.   2.
[id number]         Imaging Results (last 7 days)          Procedure Component Value Ref Range Date/Time     CT Kidney WO Contrast [[id number]] Resulted: 08/16/18 1340     Order Status: Completed Updated: 08/16/18 1242     Narrative:       EXAM: CT KIDNEY WO CONTRAST -- 8/16/2018 10:45 AM  HISTORY: 58 years, Male, flank pain, unable to urinate,   COMPARISON: None  DLP: 1330 mGy cm. TECHNIQUE: Unenhanced axial images of the abdomen and pelvis obtained  with coronal and sagittal reformats. FINDINGS: Evaluation limited secondary to lack of intravenous  contrast.   LUNG BASES: No acute findings of the lung bases. No inferior  pericardial or pleural effusion. LIVER: The liver is normal in size and contour. GALLBLADDER and BILLARY: The gallbladder is within normal limits. No  radiodense cholelithiasis.    PANCREAS: The pancreas is normal in noncontrasted CT appearance. SPLEEN: The spleen is normal in size. ADRENAL: No adrenal mass. GENITOURINARY:   No right hydronephrosis or urolithiasis. Left kidney with a 9 mm  calcification at the renal pelvis. Left kidney with an exophytic 0.9  cm round mass of intermediate density. The urinary bladder is thickened with an appearance suggesting chronic  outlet obstruction. Prostate measures 6 cm in transverse dimension, enlarged. The prostate  indents the base of the bladder. BOWEL: Stomach and duodenum have normal course and caliber. Suspected  duodenal diverticulum at the transverse segment measuring 1.7 cm on  axial series 2 image 55 and coronal series 3 image 44. No abnormally  dilated loops of bowel or bowel wall thickening. Large amount of  colonic stool. Normal caliber appendix on axial images 92-1 11. RETROPERITONEUM:   The abdominal aorta is normal in course and  caliber.  Mild calcified aortoiliac atherosclerosis. No abdominal or  pelvic adenopathy. PERITONEUM: No ascites/free fluid. No pneumoperitoneum. ABDOMINAL WALL: No acute findings.    BONES:

## 2018-08-22 LAB
ALBUMIN SERPL-MCNC: 3.2 G/DL (ref 3.5–5.2)
ALP BLD-CCNC: 62 U/L (ref 40–130)
ALT SERPL-CCNC: 44 U/L (ref 5–41)
ANION GAP SERPL CALCULATED.3IONS-SCNC: 13 MMOL/L (ref 7–19)
AST SERPL-CCNC: 30 U/L (ref 5–40)
BASOPHILS ABSOLUTE: 0.1 K/UL (ref 0–0.2)
BASOPHILS RELATIVE PERCENT: 0.5 % (ref 0–1)
BILIRUB SERPL-MCNC: <0.2 MG/DL (ref 0.2–1.2)
BLOOD CULTURE, ROUTINE: NORMAL
BUN BLDV-MCNC: 10 MG/DL (ref 6–20)
CALCIUM SERPL-MCNC: 8.5 MG/DL (ref 8.6–10)
CHLORIDE BLD-SCNC: 104 MMOL/L (ref 98–111)
CO2: 24 MMOL/L (ref 22–29)
CREAT SERPL-MCNC: 0.9 MG/DL (ref 0.5–1.2)
EOSINOPHILS ABSOLUTE: 0.1 K/UL (ref 0–0.6)
EOSINOPHILS RELATIVE PERCENT: 1.1 % (ref 0–5)
ERYTHROPOIETIN: 148 MU/ML (ref 4–27)
FREE KAPPA LIGHT CHAINS: 1.83 MG/DL (ref 0.37–1.94)
FREE KAPPA/LAMBDA RATIO: 1.02 (ref 0.26–1.65)
FREE LAMBDA LIGHT CHAINS: 1.79 MG/DL (ref 0.57–2.63)
GFR NON-AFRICAN AMERICAN: >60
GLUCOSE BLD-MCNC: 96 MG/DL (ref 74–109)
HCT VFR BLD CALC: 28.1 % (ref 42–52)
HEMOGLOBIN: 9.2 G/DL (ref 14–18)
IGA: 178 MG/DL (ref 68–408)
IGG: 892 MG/DL (ref 768–1632)
IGM: 21 MG/DL (ref 35–263)
LYMPHOCYTES ABSOLUTE: 2 K/UL (ref 1.1–4.5)
LYMPHOCYTES RELATIVE PERCENT: 19.8 % (ref 20–40)
MCH RBC QN AUTO: 27.3 PG (ref 27–31)
MCHC RBC AUTO-ENTMCNC: 32.7 G/DL (ref 33–37)
MCV RBC AUTO: 83.4 FL (ref 80–94)
MONOCYTES ABSOLUTE: 1.2 K/UL (ref 0–0.9)
MONOCYTES RELATIVE PERCENT: 11.6 % (ref 0–10)
NEUTROPHILS ABSOLUTE: 6.3 K/UL (ref 1.5–7.5)
NEUTROPHILS RELATIVE PERCENT: 63.4 % (ref 50–65)
OCCULT BLOOD DIAGNOSTIC: NORMAL
OCCULT BLOOD QC: NORMAL
PDW BLD-RTO: 13.4 % (ref 11.5–14.5)
PLATELET # BLD: 369 K/UL (ref 130–400)
PMV BLD AUTO: 9.8 FL (ref 9.4–12.4)
POTASSIUM SERPL-SCNC: 4.1 MMOL/L (ref 3.5–5)
RBC # BLD: 3.37 M/UL (ref 4.7–6.1)
SODIUM BLD-SCNC: 141 MMOL/L (ref 136–145)
TOTAL PROTEIN: 5.9 G/DL (ref 6.6–8.7)
WBC # BLD: 9.9 K/UL (ref 4.8–10.8)

## 2018-08-22 PROCEDURE — 99232 SBSQ HOSP IP/OBS MODERATE 35: CPT | Performed by: INTERNAL MEDICINE

## 2018-08-22 PROCEDURE — 2580000003 HC RX 258: Performed by: NURSE PRACTITIONER

## 2018-08-22 PROCEDURE — 82272 OCCULT BLD FECES 1-3 TESTS: CPT

## 2018-08-22 PROCEDURE — 6360000002 HC RX W HCPCS: Performed by: INTERNAL MEDICINE

## 2018-08-22 PROCEDURE — 6370000000 HC RX 637 (ALT 250 FOR IP): Performed by: INTERNAL MEDICINE

## 2018-08-22 PROCEDURE — 6360000002 HC RX W HCPCS: Performed by: NURSE PRACTITIONER

## 2018-08-22 PROCEDURE — 36415 COLL VENOUS BLD VENIPUNCTURE: CPT

## 2018-08-22 PROCEDURE — 2580000003 HC RX 258: Performed by: INTERNAL MEDICINE

## 2018-08-22 PROCEDURE — 6370000000 HC RX 637 (ALT 250 FOR IP): Performed by: UROLOGY

## 2018-08-22 PROCEDURE — 85025 COMPLETE CBC W/AUTO DIFF WBC: CPT

## 2018-08-22 PROCEDURE — 6360000002 HC RX W HCPCS: Performed by: UROLOGY

## 2018-08-22 PROCEDURE — 99232 SBSQ HOSP IP/OBS MODERATE 35: CPT | Performed by: PHYSICIAN ASSISTANT

## 2018-08-22 PROCEDURE — 2580000003 HC RX 258: Performed by: UROLOGY

## 2018-08-22 PROCEDURE — 80053 COMPREHEN METABOLIC PANEL: CPT

## 2018-08-22 PROCEDURE — 1210000000 HC MED SURG R&B

## 2018-08-22 RX ADMIN — Medication 2 G: at 09:28

## 2018-08-22 RX ADMIN — ENOXAPARIN SODIUM 40 MG: 40 INJECTION SUBCUTANEOUS at 16:35

## 2018-08-22 RX ADMIN — LISINOPRIL 10 MG: 10 TABLET ORAL at 08:14

## 2018-08-22 RX ADMIN — Medication 1 CAPSULE: at 08:13

## 2018-08-22 RX ADMIN — FAMOTIDINE 20 MG: 20 TABLET ORAL at 08:13

## 2018-08-22 RX ADMIN — Medication 2 G: at 16:36

## 2018-08-22 RX ADMIN — TAMSULOSIN HYDROCHLORIDE 0.8 MG: 0.4 CAPSULE ORAL at 08:14

## 2018-08-22 RX ADMIN — FAMOTIDINE 20 MG: 20 TABLET ORAL at 20:45

## 2018-08-22 RX ADMIN — IRON SUCROSE 400 MG: 20 INJECTION, SOLUTION INTRAVENOUS at 09:38

## 2018-08-22 RX ADMIN — Medication 10 ML: at 10:21

## 2018-08-22 RX ADMIN — FINASTERIDE 5 MG: 5 TABLET, FILM COATED ORAL at 08:14

## 2018-08-22 RX ADMIN — Medication 2 G: at 00:33

## 2018-08-22 RX ADMIN — SODIUM CHLORIDE: 9 INJECTION, SOLUTION INTRAVENOUS at 09:19

## 2018-08-22 RX ADMIN — AMLODIPINE BESYLATE 10 MG: 10 TABLET ORAL at 08:13

## 2018-08-22 ASSESSMENT — ENCOUNTER SYMPTOMS
COUGH: 0
NAUSEA: 0
ORTHOPNEA: 0
DOUBLE VISION: 0
WHEEZING: 0
HEARTBURN: 0
BLURRED VISION: 0
HEMOPTYSIS: 0
BACK PAIN: 0
SHORTNESS OF BREATH: 0
DIARRHEA: 0
BLOOD IN STOOL: 0
EYES NEGATIVE: 1
SORE THROAT: 0
SPUTUM PRODUCTION: 0
EYE REDNESS: 0
RESPIRATORY NEGATIVE: 1
GASTROINTESTINAL NEGATIVE: 1
CONSTIPATION: 0
EYE PAIN: 0
EYE DISCHARGE: 0
ABDOMINAL PAIN: 0
VOMITING: 0

## 2018-08-22 NOTE — PLAN OF CARE
Problem: Falls - Risk of:  Goal: Will remain free from falls  Will remain free from falls   Outcome: Ongoing    Goal: Absence of physical injury  Absence of physical injury   Outcome: Ongoing      Problem: Urinary Elimination:  Goal: Complications related to the disease process, condition or treatment will be avoided or minimized  Outcome: Ongoing      Problem: Pain:  Goal: Pain level will decrease  Pain level will decrease   Outcome: Ongoing    Goal: Control of acute pain  Control of acute pain   Outcome: Ongoing    Goal: Control of chronic pain  Control of chronic pain   Outcome: Ongoing      Problem: Discharge Planning:  Goal: Discharged to appropriate level of care  Discharged to appropriate level of care   Outcome: Ongoing      Problem: Infection, Septic Shock:  Goal: Will show no infection signs and symptoms  Will show no infection signs and symptoms   Outcome: Ongoing

## 2018-08-22 NOTE — PROGRESS NOTES
GI  - PROGRESS NOTE    Admit Date: 8/16/2018             Chief Complaint: no clinical change. No bleeding reported    Patient being seen for:  anemia     DIET GENERAL;                         Bowel movement: no black or bloody stools    Pain:None  Nausea:None    Intake/Output Summary (Last 24 hours) at 08/22/18 1034  Last data filed at 08/22/18 0921   Gross per 24 hour   Intake          4518.47 ml   Output             4600 ml   Net           -81.53 ml               Lab /Radiology:   Scheduled Meds: Reviewed          -----------------------------------------------------------------          Recent Labs      08/20/18 0211 08/20/18   0948  08/21/18   0459 08/22/18   0250   WBC  7.5   --   8.4  9.9   RBC  3.28*   --   3.36*  3.37*   HGB  8.9*   --   9.1*  9.2*   HCT  26.9*  28.1*  28.0*  28.1*   PLT  302   --   325  369     Recent Labs      08/20/18 0211 08/21/18   0459  08/22/18   0250   NA  140  139  141   K  3.4*  3.4*  3.9  3.9  4.1   ANIONGAP  11  12  13   CL  105  104  104   CO2  24  23  24   BUN  11  9  10   CREATININE  0.8  0.8  0.9   GLUCOSE  94  106  96   CALCIUM  8.1*  8.3*  8.5*     Recent Labs      08/20/18 0211 08/21/18   0459  08/22/18   0250   AST  21  30  30   ALT  23  42*  44*   BILITOT  <0.2  <0.2  <0.2   ALKPHOS  54  55  62     No results for input(s): AMYLASE, LIPASE in the last 72 hours. Troponin T: No results for input(s): TROPONINI in the last 72 hours. Pro-BNP: No results for input(s): BNP in the last 72 hours. INR: No results for input(s): INR in the last 72 hours. Physical Exam:   Vitals: BP (!) 152/94   Pulse 71   Temp 99 °F (37.2 °C) (Temporal)   Resp 18   Ht 6' 1\" (1.854 m)   Wt 216 lb 9.6 oz (98.2 kg)   SpO2 100%   BMI 28.58 kg/m²     HEENT: Pupils equal, round, reactive to light       Neck supple. Trachea midline. No crepitus  Lungs: breath sounds bilaterally.   Normal excursion  Heart[de-identified]    RRR without heave or thrill  Abdomen: soft, non-tender; bowel sounds normal; no masses,  no organomegaly. No encarcerated hernia detected. No organomegaly detected  Extremities: no cyanosis or clubbing  Lymphatic: No significant lymph node enlargement papable in the neck , groin or umbilicus  Neurologic: alert. oriented        Impression:     1. Iron def. Anemia, chronic on oral iron. Neg. Colonoscopy one yr ago. Stools heme +      Plan:   1. OP GI appnt. 2 weeks after D/C for poss. EGD, and if neg. Repeat c-scope. Pt desires OP eval, and assures me he will keep appnt. Giancarlo Mohr M.D.   Gastroenterology

## 2018-08-22 NOTE — PROGRESS NOTES
Department of Urology  Physician Assistant Progress Note  Lobo Azar PA-C      SUBJECTIVE:  No heart voiding fine. Still with testalgia    OBJECTIVE:  Cefepime day # 4. REVIEW OF SYSTEMS:   Review of Systems   Constitutional: Negative for chills, fever and malaise/fatigue. HENT: Negative. Eyes: Negative. Respiratory: Negative. Cardiovascular: Negative. Gastrointestinal: Negative. Genitourinary:        Obstructive voiding symptoms, testicular pain and swelling   Musculoskeletal: Negative. Skin: Negative. Neurological: Negative. Endo/Heme/Allergies: Negative. Psychiatric/Behavioral: Negative.           Physical  VITALS:  BP (!) 152/94   Pulse 71   Temp 99 °F (37.2 °C) (Temporal)   Resp 18   Ht 6' 1\" (1.854 m)   Wt 216 lb 9.6 oz (98.2 kg)   SpO2 100%   BMI 28.58 kg/m²   TEMPERATURE:  Current - Temp: 99 °F (37.2 °C); Max - Temp  Av.7 °F (37.1 °C)  Min: 98.1 °F (36.7 °C)  Max: 99.6 °F (37.6 °C)  Intake: 4,318 ml/ Output: 4,725 ml. BACK: inspection of back is normal  ABDOMEN:  soft, non-distended and non-tender  HEART:  normal rate and regular rhythm  CHEST:  Normal respiratory effort  GENITAL/URINARY:  Ehart catheter out. No penile swelling. Right scrotum and testicle essentially normal. Left testicle enlarged and tender it is swollen and firm. No fluctuance. Per patient has improved form previous.   Data  CBC:   Lab Results   Component Value Date    WBC 9.9 2018    RBC 3.37 2018    HGB 9.2 2018    HCT 28.1 2018    MCV 83.4 2018    MCH 27.3 2018    MCHC 32.7 2018    RDW 13.4 2018     2018    MPV 9.8 2018     CMP:    Lab Results   Component Value Date     2018    K 4.1 2018    K 3.9 2018     2018    CO2 24 2018    BUN 10 2018    CREATININE 0.9 2018    LABGLOM >60 2018    GLUCOSE 96 2018    PROT 5.9 2018    LABALBU 3.2 2018    CALCIUM

## 2018-08-22 NOTE — PROGRESS NOTES
Patient name: Ute Tate  Patient : 1960  7:15 AM  ROOM 528    Patient Active Problem List   Diagnosis Code    Renal calculus, left N20.0    Benign prostatic hyperplasia with urinary obstruction N40.1, N13.8    Sepsis ([facility] [address] A41.9    BETTY (acute kidney injury) ([facility] [address] N17.9       Subjective: Hematuria resolved. No new complaints. Spoke with nurse and Marta Boateng related to needing hemoccult stool. HISTORY OF PRESENT ILLNESS:   Mr. Ute Tate is a 21-year-old gentleman that presented to Dr. Cornejo Scot office on 2018 for gross hematuria and obstructive voiding symptoms. He had a cystoscopy on 2018 that revealed a large prostate with 3+ trilobar prostatic hyperplasia. Marta Boateng was transported to [facility] ED on 2018 due to febrile illness, chills and hypotension and was admitted for treatment of urosepsis.      Oncology consultation has been requested for further evaluation of normocytic anemia. Marta Boateng has a known history of iron deficiency anemia and was taking oral iron replacement prior to admission. He reports have a colonoscopy without findings last year in [place], [place]. Marta Boateng complains of always being cold. CBC on admission (2018) documented a hemoglobin of 10.2 and an MCV of 84.6. Marta Boateng was also noted to have leukocytosis with neutrophilia secondary to acute illness at admission, his WBC and ANC have now normalized. Review of Systems   Constitutional: Negative. Negative for chills, diaphoresis, fever, malaise/fatigue and weight loss. HENT: Negative. Negative for congestion, ear pain, hearing loss, nosebleeds, sore throat and tinnitus. Eyes: Negative. Negative for blurred vision, double vision, pain, discharge and redness. Respiratory: Negative. Negative for cough, hemoptysis, sputum production, shortness of breath and wheezing. Cardiovascular: Negative. Negative for chest pain, palpitations, orthopnea, claudication and leg swelling. Culture Recent : Recent Labs      08/16/18   1225   LABURIN  <50,000 CFU/ml*  Light growth     Organism Recent :   Recent Labs      08/16/18   1225   ORG  Pseudomonas aeruginosa*          Radiology:   Ct Kidney Wo Contrast    Result Date: 8/16/2018  EXAM: CT KIDNEY WO CONTRAST -- 8/16/2018 10:45 AM HISTORY: 66 years, Male, flank pain, unable to urinate, COMPARISON: None DLP: 1330 mGy cm. TECHNIQUE: Unenhanced axial images of the abdomen and pelvis obtained with coronal and sagittal reformats. FINDINGS: Evaluation limited secondary to lack of intravenous contrast. LUNG BASES: No acute findings of the lung bases. No inferior pericardial or pleural effusion. LIVER: The liver is normal in size and contour. GALLBLADDER and BILLARY: The gallbladder is within normal limits. No radiodense cholelithiasis. PANCREAS: The pancreas is normal in noncontrasted CT appearance. SPLEEN: The spleen is normal in size. ADRENAL: No adrenal mass. GENITOURINARY: No right hydronephrosis or urolithiasis. Left kidney with a 9 mm calcification at the renal pelvis. Left kidney with an exophytic 0.9 cm round mass of intermediate density. The urinary bladder is thickened with an appearance suggesting chronic outlet obstruction. Prostate measures 6 cm in transverse dimension, enlarged. The prostate indents the base of the bladder. BOWEL: Stomach and duodenum have normal course and caliber. Suspected duodenal diverticulum at the transverse segment measuring 1.7 cm on axial series 2 image 55 and coronal series 3 image 44. No abnormally dilated loops of bowel or bowel wall thickening. Large amount of colonic stool. Normal caliber appendix on axial images 92-1 11. RETROPERITONEUM:   The abdominal aorta is normal in course and caliber. Mild calcified aortoiliac atherosclerosis. No abdominal or pelvic adenopathy. PERITONEUM: No ascites/free fluid. No pneumoperitoneum. ABDOMINAL WALL: No acute findings.  BONES: Degenerative changes in the visualized spine including levocurvature centered at L2-3. No acute bony findings. 1. Urinary bladder wall thickening, may be due to bladder outlet obstruction. Infiltrative malignancy not excluded. 2. Enlarged prostate fissures 6 cm in transverse dimension and indents the base of the urinary bladder. 3. No hydronephrosis. There is a nonobstructing 9 mm left renal calculus.  Signed by Dr Toñito Wellington on 8/16/2018 12:40 PM      Medications  Current Facility-Administered Medications   Medication Dose Route Frequency Provider Last Rate Last Dose    iron sucrose (VENOFER) 400 mg in sodium chloride 0.9 % 250 mL IVPB  400 mg Intravenous Q24H [facility]        lactobacillus (CULTURELLE) capsule 1 capsule  1 capsule Oral Daily Yandy Gallegos MD   1 capsule at 08/21/18 0851    enoxaparin (LOVENOX) injection 40 mg  40 mg Subcutaneous Q24H Juventino Meredith MD   40 mg at 08/21/18 1546    famotidine (PEPCID) tablet 20 mg  20 mg Oral BID Juventino Meredith MD   20 mg at 08/21/18 2132    ceFEPIme (MAXIPIME) 2 g in sodium chloride (PF) 20 mL IV syringe  2 g Intravenous Q8H Yandy Gallegos MD   2 g at 08/22/18 0033    morphine (PF) injection 2 mg  2 mg Intravenous Q2H PRN Juventino Meredith MD        oxyCODONE-acetaminophen (PERCOCET) 5-325 MG per tablet 2 tablet  2 tablet Oral Q4H PRN Juventino Meredith MD        sodium chloride flush 0.9 % injection 10 mL  10 mL Intravenous 2 times per day Juventino Meredith MD   10 mL at 08/20/18 0800    sodium chloride flush 0.9 % injection 10 mL  10 mL Intravenous PRN Juventino Meredith MD        acetaminophen (TYLENOL) tablet 650 mg  650 mg Oral Q4H PRN Juventino Meredith MD   650 mg at 08/17/18 2106    amLODIPine (NORVASC) tablet 10 mg  10 mg Oral Daily Juventino Meredith MD   10 mg at 08/21/18 0851    finasteride (PROSCAR) tablet 5 mg  5 mg Oral Daily Juventino Meredith MD   5 mg at 08/21/18 0851    lisinopril (PRINIVIL;ZESTRIL) tablet 10 mg  10 mg Oral Daily [facility] [place]

## 2018-08-23 VITALS
HEIGHT: 73 IN | RESPIRATION RATE: 16 BRPM | SYSTOLIC BLOOD PRESSURE: 145 MMHG | OXYGEN SATURATION: 94 % | WEIGHT: 216.6 LBS | HEART RATE: 81 BPM | TEMPERATURE: 98.7 F | BODY MASS INDEX: 28.71 KG/M2 | DIASTOLIC BLOOD PRESSURE: 86 MMHG

## 2018-08-23 LAB
ALBUMIN SERPL-MCNC: 3.4 G/DL (ref 3.5–5.2)
ALP BLD-CCNC: 68 U/L (ref 40–130)
ALT SERPL-CCNC: 49 U/L (ref 5–41)
ANION GAP SERPL CALCULATED.3IONS-SCNC: 10 MMOL/L (ref 7–19)
AST SERPL-CCNC: 32 U/L (ref 5–40)
BASOPHILS ABSOLUTE: 0.1 K/UL (ref 0–0.2)
BASOPHILS RELATIVE PERCENT: 0.7 % (ref 0–1)
BILIRUB SERPL-MCNC: <0.2 MG/DL (ref 0.2–1.2)
BUN BLDV-MCNC: 9 MG/DL (ref 6–20)
CALCIUM SERPL-MCNC: 8.8 MG/DL (ref 8.6–10)
CHLORIDE BLD-SCNC: 103 MMOL/L (ref 98–111)
CO2: 26 MMOL/L (ref 22–29)
CREAT SERPL-MCNC: 0.9 MG/DL (ref 0.5–1.2)
EOSINOPHILS ABSOLUTE: 0.2 K/UL (ref 0–0.6)
EOSINOPHILS RELATIVE PERCENT: 1.8 % (ref 0–5)
GFR NON-AFRICAN AMERICAN: >60
GLUCOSE BLD-MCNC: 93 MG/DL (ref 74–109)
HCT VFR BLD CALC: 30.8 % (ref 42–52)
HEMOGLOBIN: 9.8 G/DL (ref 14–18)
LYMPHOCYTES ABSOLUTE: 2.2 K/UL (ref 1.1–4.5)
LYMPHOCYTES RELATIVE PERCENT: 25.1 % (ref 20–40)
MCH RBC QN AUTO: 27.1 PG (ref 27–31)
MCHC RBC AUTO-ENTMCNC: 31.8 G/DL (ref 33–37)
MCV RBC AUTO: 85.1 FL (ref 80–94)
MONOCYTES ABSOLUTE: 0.8 K/UL (ref 0–0.9)
MONOCYTES RELATIVE PERCENT: 9.4 % (ref 0–10)
NEUTROPHILS ABSOLUTE: 5 K/UL (ref 1.5–7.5)
NEUTROPHILS RELATIVE PERCENT: 57.2 % (ref 50–65)
PDW BLD-RTO: 13.7 % (ref 11.5–14.5)
PLATELET # BLD: 423 K/UL (ref 130–400)
PMV BLD AUTO: 9.5 FL (ref 9.4–12.4)
POTASSIUM SERPL-SCNC: 4.2 MMOL/L (ref 3.5–5)
RBC # BLD: 3.62 M/UL (ref 4.7–6.1)
SODIUM BLD-SCNC: 139 MMOL/L (ref 136–145)
TOTAL PROTEIN: 6.3 G/DL (ref 6.6–8.7)
WBC # BLD: 8.7 K/UL (ref 4.8–10.8)

## 2018-08-23 PROCEDURE — 6360000002 HC RX W HCPCS: Performed by: NURSE PRACTITIONER

## 2018-08-23 PROCEDURE — 80053 COMPREHEN METABOLIC PANEL: CPT

## 2018-08-23 PROCEDURE — 2580000003 HC RX 258: Performed by: NURSE PRACTITIONER

## 2018-08-23 PROCEDURE — 6370000000 HC RX 637 (ALT 250 FOR IP): Performed by: UROLOGY

## 2018-08-23 PROCEDURE — 2580000003 HC RX 258: Performed by: INTERNAL MEDICINE

## 2018-08-23 PROCEDURE — 6360000002 HC RX W HCPCS: Performed by: INTERNAL MEDICINE

## 2018-08-23 PROCEDURE — 85025 COMPLETE CBC W/AUTO DIFF WBC: CPT

## 2018-08-23 PROCEDURE — 36415 COLL VENOUS BLD VENIPUNCTURE: CPT

## 2018-08-23 PROCEDURE — 6370000000 HC RX 637 (ALT 250 FOR IP): Performed by: INTERNAL MEDICINE

## 2018-08-23 RX ORDER — CEFDINIR 300 MG/1
300 CAPSULE ORAL 2 TIMES DAILY
Qty: 28 CAPSULE | Refills: 0 | Status: SHIPPED | OUTPATIENT
Start: 2018-08-23 | End: 2018-08-23 | Stop reason: HOSPADM

## 2018-08-23 RX ORDER — LEVOFLOXACIN 750 MG/1
750 TABLET ORAL DAILY
Qty: 21 TABLET | Refills: 0 | Status: SHIPPED | OUTPATIENT
Start: 2018-08-23 | End: 2018-09-13

## 2018-08-23 RX ORDER — TAMSULOSIN HYDROCHLORIDE 0.4 MG/1
0.8 CAPSULE ORAL DAILY
Qty: 30 CAPSULE | Refills: 3 | Status: ON HOLD | OUTPATIENT
Start: 2018-08-23 | End: 2018-12-05 | Stop reason: HOSPADM

## 2018-08-23 RX ORDER — FERROUS SULFATE 325(65) MG
325 TABLET ORAL 2 TIMES DAILY WITH MEALS
Qty: 30 TABLET | Refills: 3 | Status: ON HOLD | OUTPATIENT
Start: 2018-08-24 | End: 2018-12-04

## 2018-08-23 RX ORDER — FERROUS SULFATE 325(65) MG
325 TABLET ORAL 2 TIMES DAILY WITH MEALS
Status: DISCONTINUED | OUTPATIENT
Start: 2018-08-24 | End: 2018-08-23 | Stop reason: HOSPADM

## 2018-08-23 RX ORDER — FINASTERIDE 5 MG/1
5 TABLET, FILM COATED ORAL DAILY
Qty: 30 TABLET | Refills: 3 | Status: SHIPPED | OUTPATIENT
Start: 2018-08-23 | End: 2018-10-29

## 2018-08-23 RX ADMIN — IRON SUCROSE 400 MG: 20 INJECTION, SOLUTION INTRAVENOUS at 08:04

## 2018-08-23 RX ADMIN — AMLODIPINE BESYLATE 10 MG: 10 TABLET ORAL at 08:05

## 2018-08-23 RX ADMIN — Medication 2 G: at 00:34

## 2018-08-23 RX ADMIN — FAMOTIDINE 20 MG: 20 TABLET ORAL at 08:05

## 2018-08-23 RX ADMIN — Medication 1 CAPSULE: at 08:05

## 2018-08-23 RX ADMIN — Medication 2 G: at 08:04

## 2018-08-23 RX ADMIN — TAMSULOSIN HYDROCHLORIDE 0.8 MG: 0.4 CAPSULE ORAL at 08:05

## 2018-08-23 RX ADMIN — LISINOPRIL 10 MG: 10 TABLET ORAL at 08:05

## 2018-08-23 RX ADMIN — FINASTERIDE 5 MG: 5 TABLET, FILM COATED ORAL at 08:05

## 2018-08-23 ASSESSMENT — ENCOUNTER SYMPTOMS
BACK PAIN: 0
VOMITING: 0
HEMOPTYSIS: 0
SORE THROAT: 0
HEARTBURN: 0
BLURRED VISION: 0
RESPIRATORY NEGATIVE: 1
DIARRHEA: 0
SHORTNESS OF BREATH: 0
WHEEZING: 0
NAUSEA: 0
EYE PAIN: 0
EYES NEGATIVE: 1
CONSTIPATION: 0
COUGH: 0
BLOOD IN STOOL: 0
EYE REDNESS: 0
GASTROINTESTINAL NEGATIVE: 1
EYE DISCHARGE: 0
ORTHOPNEA: 0
ABDOMINAL PAIN: 0
SPUTUM PRODUCTION: 0
DOUBLE VISION: 0

## 2018-08-23 NOTE — PROGRESS NOTES
Talked to Alan Wells with [facility]/  concerning patient needs ferrous sulfate added to discharge medication list. Alan Wells stated ok for nurse to add to medication list for discharge. Called medication to Toni Goldberg, APRN with [facility]. All new medications discussed to Toni Goldberg, APRN. Also, discussed antibiotic for discharge with . Harinder Chakraborty [facility] prescribed omnicef for discharge but  stated would not treat pseudomonas. Patient will need Levaquin at discharge. Harinder Chakraborty [facility] paged. Awaiting return phone call. [id number] Alla Valencia for patient to be discharged from [address] [facility] standpoint follow up PRN.

## 2018-08-23 NOTE — PROGRESS NOTES
present. No tracheal deviation present. Cardiovascular: Normal rate, regular rhythm, normal heart sounds and intact distal pulses. Exam reveals no gallop and no friction rub. No murmur heard. Pulmonary/Chest: Effort normal and breath sounds normal. No respiratory distress. He has no wheezes. He has no rales. He exhibits no tenderness. Abdominal: Soft. Bowel sounds are normal. He exhibits no distension and no mass. There is no tenderness. There is no rebound and no guarding. No hernia. Genitourinary: Right testis shows swelling. Left testis shows swelling. Musculoskeletal: Normal range of motion. He exhibits no edema, tenderness or deformity. Lymphadenopathy:     He has no cervical adenopathy. Neurological: He is alert and oriented to person, place, and time. No cranial nerve deficit. Coordination normal.   Skin: Skin is warm. No rash noted. He is not diaphoretic. No erythema. No pallor. Psychiatric: He has a normal mood and affect. His behavior is normal. Thought content normal.   Nursing note and vitals reviewed. BMP:   Recent Labs      08/22/18   0250  08/23/18   0355   NA  141  139   K  4.1  4.2   CL  104  103   CO2  24  26   BUN  10  9   CREATININE  0.9  0.9   GLUCOSE  96  93   CALCIUM  8.5*  8.8     CBC:   Recent Labs      08/22/18   0250  08/23/18   0355   WBC  9.9  8.7   HGB  9.2*  9.8*   HCT  28.1*  30.8*   MCV  83.4  85.1   PLT  369  423*     CMP:    Recent Labs      08/22/18   0250  08/23/18   0355   NA  141  139   K  4.1  4.2   CL  104  103   CO2  24  26   BUN  10  9   CREATININE  0.9  0.9   LABGLOM  >60  >60   GLUCOSE  96  93   PROT  5.9*  6.3*   LABALBU  3.2*  3.4*   CALCIUM  8.5*  8.8   BILITOT  <0.2  <0.2   ALKPHOS  62  68   AST  30  32   ALT  44*  49*       30 Day lookback of cultures:    Blood Culture Recent:   Recent Labs      08/17/18   1825   BC  No growth after 5 days of incubation.        Urine Culture Recent :   Recent Labs      08/16/18   1225   LABURIN  <50,000 CFU/ml* Urinary bladder wall thickening, may be due to bladder outlet obstruction. Infiltrative malignancy not excluded. 2. Enlarged prostate fissures 6 cm in transverse dimension and indents the base of the urinary bladder. 3. No hydronephrosis. There is a nonobstructing 9 mm left renal calculus.  Signed by Dr Lenin Powell on 8/16/2018 12:40 PM      Medications  Current Facility-Administered Medications   Medication Dose Route Frequency Provider Last Rate Last Dose    iron sucrose (VENOFER) 400 mg in sodium chloride 0.9 % 250 mL IVPB  400 mg Intravenous Q24H LORETA Doss   Stopped at 08/22/18 1336    lactobacillus (CULTURELLE) capsule 1 capsule  1 capsule Oral Daily Gayle Pickard MD   1 capsule at 08/22/18 0813    enoxaparin (LOVENOX) injection 40 mg  40 mg Subcutaneous Q24H Rubens Sexton MD   40 mg at 08/22/18 1635    famotidine (PEPCID) tablet 20 mg  20 mg Oral BID Rubens Sexton MD   20 mg at 08/22/18 2045    ceFEPIme (MAXIPIME) 2 g in sodium chloride (PF) 20 mL IV syringe  2 g Intravenous Q8H Gayle Pickard MD   2 g at 08/23/18 0034    morphine (PF) injection 2 mg  2 mg Intravenous Q2H PRN Rubens Sexton MD        oxyCODONE-acetaminophen (PERCOCET) 5-325 MG per tablet 2 tablet  2 tablet Oral Q4H PRN Rubens Sexton MD        sodium chloride flush 0.9 % injection 10 mL  10 mL Intravenous 2 times per day Rubens Sexton MD   10 mL at 08/22/18 1021    sodium chloride flush 0.9 % injection 10 mL  10 mL Intravenous PRN Rubens Sexton MD        acetaminophen (TYLENOL) tablet 650 mg  650 mg Oral Q4H PRN Rubens Sexton MD   650 mg at 08/17/18 2106    amLODIPine (NORVASC) tablet 10 mg  10 mg Oral Daily Rubens Sexton MD   10 mg at 08/22/18 0813    finasteride (PROSCAR) tablet 5 mg  5 mg Oral Daily Rubens Sexton MD   5 mg at 08/22/18 0814    lisinopril (PRINIVIL;ZESTRIL) tablet 10 mg  10 mg Oral Daily Rubens Sexton MD   10 mg at 08/22/18 0814    0.9 % sodium

## 2018-08-24 LAB
ALBUMIN SERPL-MCNC: 2.51 G/DL (ref 3.75–5.01)
ALPHA-1-GLOBULIN: 0.52 G/DL (ref 0.19–0.46)
ALPHA-2-GLOBULIN: 0.85 G/DL (ref 0.48–1.05)
BETA GLOBULIN: 0.7 G/DL (ref 0.48–1.1)
EKG P AXIS: 46 DEGREES
EKG P-R INTERVAL: 170 MS
EKG Q-T INTERVAL: 332 MS
EKG QRS DURATION: 104 MS
EKG QTC CALCULATION (BAZETT): 377 MS
EKG T AXIS: 33 DEGREES
GAMMA GLOBULIN: 0.91 G/DL (ref 0.62–1.51)
IMMUNOFIXATION REFLEX: ABNORMAL
SPE/IFE INTERPRETATION: ABNORMAL
TOTAL PROTEIN: 5.5 G/DL (ref 6–8.3)

## 2018-08-27 LAB — CULTURE, BLOOD 2: NORMAL

## 2018-09-06 ENCOUNTER — OFFICE VISIT (OUTPATIENT)
Dept: UROLOGY | Age: 58
End: 2018-09-06
Payer: COMMERCIAL

## 2018-09-06 ENCOUNTER — HOSPITAL ENCOUNTER (OUTPATIENT)
Dept: GENERAL RADIOLOGY | Age: 58
Discharge: HOME | End: 2018-09-06
Payer: COMMERCIAL

## 2018-09-06 VITALS
TEMPERATURE: 96.1 F | HEART RATE: 92 BPM | WEIGHT: 209 LBS | DIASTOLIC BLOOD PRESSURE: 72 MMHG | SYSTOLIC BLOOD PRESSURE: 132 MMHG | HEIGHT: 74 IN | BODY MASS INDEX: 26.82 KG/M2

## 2018-09-06 DIAGNOSIS — N20.0 KIDNEY STONE ON LEFT SIDE: Primary | ICD-10-CM

## 2018-09-06 DIAGNOSIS — N40.1 BPH WITH OBSTRUCTION/LOWER URINARY TRACT SYMPTOMS: ICD-10-CM

## 2018-09-06 DIAGNOSIS — N13.8 BPH WITH OBSTRUCTION/LOWER URINARY TRACT SYMPTOMS: ICD-10-CM

## 2018-09-06 DIAGNOSIS — N20.0 KIDNEY STONE: ICD-10-CM

## 2018-09-06 DIAGNOSIS — R31.0 GROSS HEMATURIA: ICD-10-CM

## 2018-09-06 DIAGNOSIS — N45.3 EPIDIDYMOORCHITIS: ICD-10-CM

## 2018-09-06 LAB
BACTERIA URINE, POC: NORMAL
BILIRUBIN URINE: 0 MG/DL
BLOOD, URINE: POSITIVE
CASTS URINE, POC: NORMAL
CLARITY: CLEAR
COLOR: YELLOW
CRYSTALS URINE, POC: NORMAL
EPI CELLS URINE, POC: NORMAL
GLUCOSE URINE: NORMAL
KETONES, URINE: NEGATIVE
LEUKOCYTE EST, POC: NORMAL
NITRITE, URINE: NEGATIVE
PH UA: 7 (ref 4.5–8)
PROTEIN UA: NEGATIVE
RBC URINE, POC: NORMAL
SPECIFIC GRAVITY UA: 1.02 (ref 1–1.03)
UROBILINOGEN, URINE: NORMAL
WBC URINE, POC: NORMAL
YEAST URINE, POC: NORMAL

## 2018-09-06 PROCEDURE — 99214 OFFICE O/P EST MOD 30 MIN: CPT | Performed by: PHYSICIAN ASSISTANT

## 2018-09-06 PROCEDURE — 74018 RADEX ABDOMEN 1 VIEW: CPT

## 2018-09-06 PROCEDURE — 81001 URINALYSIS AUTO W/SCOPE: CPT | Performed by: PHYSICIAN ASSISTANT

## 2018-09-06 ASSESSMENT — ENCOUNTER SYMPTOMS
SINUS PAIN: 0
WHEEZING: 0
EYE PAIN: 0
VOMITING: 0
BLURRED VISION: 0
ABDOMINAL PAIN: 0
SHORTNESS OF BREATH: 0
BACK PAIN: 0

## 2018-09-06 NOTE — PROGRESS NOTES
hallucinations. The patient is not nervous/anxious. PHYSICAL EXAM:  /72   Pulse 92   Temp 96.1 °F (35.6 °C) (Temporal)   Ht 6' 2\" (1.88 m)   Wt 209 lb (94.8 kg)   BMI 26.83 kg/m²   Physical Exam   Constitutional: No distress. HENT:   Mouth/Throat: No oropharyngeal exudate. Eyes: Left eye exhibits no discharge. No scleral icterus. Neck: No JVD present. No tracheal deviation present. No thyromegaly present. Cardiovascular: Exam reveals no gallop and no friction rub. Pulmonary/Chest: No stridor. He has no rales. Abdominal: He exhibits no distension and no mass. There is no rebound and no guarding. Genitourinary:         Musculoskeletal: He exhibits no edema. Neurological: No cranial nerve deficit. He exhibits normal muscle tone. Coordination normal.   Skin: He is not diaphoretic. No pallor. DATA:  U/A:    Lab Results   Component Value Date    NITRITE neg 08/13/2018    COLORU Yellow 09/06/2018    PROTEINU Negative 09/06/2018    PHUR 7.0 09/06/2018    LABCAST 2-4 Hyaline 08/16/2018    WBCUA 3-5 08/16/2018    RBCUA 2-4 08/16/2018    MUCUS 1+ 08/16/2018    BACTERIA trace 08/16/2018    CLARITYU Clear 09/06/2018    SPECGRAV 1.020 09/06/2018    LEUKOCYTESUR neg 09/06/2018    LEUKOCYTESUR SMALL 08/16/2018    UROBILINOGEN Normal 09/06/2018    BILIRUBINUR 0 09/06/2018    BILIRUBINUR 0 08/13/2018    BLOODU Positive 09/06/2018    GLUCOSEU neg 09/06/2018         Imaging:  FINDINGS:   There is a nonobstructive bowel gas pattern moderate amount of stool   is noted in the colon. . A persistent calcification is present   projecting over the upper pole the left kidney. .   Evaluation for free intraperitoneal air is limited on a supine   radiograph, but there are no definite findings of free intraperitoneal   air. 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 There is subsequently levocurvature. Joy Linaers 1. Kidney stone on left side  The stone is easily seen on KUB he is on the schedule for ESWL this coming Tuesday with Dr. Jonnie Méndez. I reviewed medicines he should not take prior to the as well he is not on any blood thinning medicine he does not take aspirin Aleve ibuprofen or any obvious medicines that would cause alterations platelet function. I wrote orders for patient to have daily Ensure to make sure he has adequate nutritional coverage. 2. Epididymoorchitis  Still was some mild residual swelling and firmness however much improved from when he was in the hospital he completes his antibiotic next Friday I told him to continue until complete. 3. BPH with obstruction/lower urinary tract symptoms  Per the patient is voiding symptoms have improved since hospitalization however he is on maximal medical therapy and would benefit from a TURP which can be discussed in the future. Orders Placed This Encounter   Procedures    POCT Urinalysis Dipstick w/ Micro (Auto)     No orders of the defined types were placed in this encounter. Plan:  Plan for left ESWL next Tuesday as scheduled.

## 2018-09-11 ENCOUNTER — ANESTHESIA EVENT (OUTPATIENT)
Dept: OPERATING ROOM | Age: 58
End: 2018-09-11
Payer: COMMERCIAL

## 2018-09-11 ENCOUNTER — APPOINTMENT (OUTPATIENT)
Dept: GENERAL RADIOLOGY | Age: 58
End: 2018-09-11
Attending: UROLOGY
Payer: COMMERCIAL

## 2018-09-11 ENCOUNTER — ANESTHESIA (OUTPATIENT)
Dept: OPERATING ROOM | Age: 58
End: 2018-09-11
Payer: COMMERCIAL

## 2018-09-11 ENCOUNTER — HOSPITAL ENCOUNTER (OUTPATIENT)
Age: 58
Setting detail: OUTPATIENT SURGERY
Discharge: HOME | End: 2018-09-11
Attending: UROLOGY | Admitting: UROLOGY
Payer: COMMERCIAL

## 2018-09-11 VITALS
OXYGEN SATURATION: 100 % | RESPIRATION RATE: 3 BRPM | DIASTOLIC BLOOD PRESSURE: 55 MMHG | TEMPERATURE: 95.7 F | SYSTOLIC BLOOD PRESSURE: 112 MMHG

## 2018-09-11 VITALS
DIASTOLIC BLOOD PRESSURE: 93 MMHG | BODY MASS INDEX: 25.93 KG/M2 | HEART RATE: 67 BPM | OXYGEN SATURATION: 95 % | HEIGHT: 74 IN | RESPIRATION RATE: 14 BRPM | SYSTOLIC BLOOD PRESSURE: 148 MMHG | WEIGHT: 202 LBS | TEMPERATURE: 97.6 F

## 2018-09-11 DIAGNOSIS — G89.18 POSTOPERATIVE PAIN: ICD-10-CM

## 2018-09-11 DIAGNOSIS — N20.0 KIDNEY STONE ON LEFT SIDE: Primary | ICD-10-CM

## 2018-09-11 LAB
COLLAGEN ADENOSINE-5'-DIPHOSPHATE (ADP) TIME: 183 SEC (ref 51–124)
COLLAGEN EPINEPHRINE TIME: 229 SEC (ref 84–176)
HCT VFR BLD CALC: 39.1 % (ref 42–52)
HEMOGLOBIN: 12 G/DL (ref 14–18)
PLATELET FUNCTION INTERPRETATION: ABNORMAL

## 2018-09-11 PROCEDURE — 74018 RADEX ABDOMEN 1 VIEW: CPT

## 2018-09-11 PROCEDURE — 6360000002 HC RX W HCPCS: Performed by: NURSE ANESTHETIST, CERTIFIED REGISTERED

## 2018-09-11 PROCEDURE — 6370000000 HC RX 637 (ALT 250 FOR IP): Performed by: UROLOGY

## 2018-09-11 PROCEDURE — 7100000011 HC PHASE II RECOVERY - ADDTL 15 MIN: Performed by: UROLOGY

## 2018-09-11 PROCEDURE — 7100000010 HC PHASE II RECOVERY - FIRST 15 MIN: Performed by: UROLOGY

## 2018-09-11 PROCEDURE — 85576 BLOOD PLATELET AGGREGATION: CPT

## 2018-09-11 PROCEDURE — 50590 FRAGMENTING OF KIDNEY STONE: CPT | Performed by: UROLOGY

## 2018-09-11 PROCEDURE — 2580000003 HC RX 258: Performed by: UROLOGY

## 2018-09-11 PROCEDURE — 7100000001 HC PACU RECOVERY - ADDTL 15 MIN: Performed by: UROLOGY

## 2018-09-11 PROCEDURE — 36415 COLL VENOUS BLD VENIPUNCTURE: CPT

## 2018-09-11 PROCEDURE — 2500000003 HC RX 250 WO HCPCS: Performed by: NURSE ANESTHETIST, CERTIFIED REGISTERED

## 2018-09-11 PROCEDURE — 3700000000 HC ANESTHESIA ATTENDED CARE: Performed by: UROLOGY

## 2018-09-11 PROCEDURE — 85014 HEMATOCRIT: CPT

## 2018-09-11 PROCEDURE — 3700000001 HC ADD 15 MINUTES (ANESTHESIA): Performed by: UROLOGY

## 2018-09-11 PROCEDURE — 7100000000 HC PACU RECOVERY - FIRST 15 MIN: Performed by: UROLOGY

## 2018-09-11 PROCEDURE — 2580000003 HC RX 258: Performed by: NURSE ANESTHETIST, CERTIFIED REGISTERED

## 2018-09-11 PROCEDURE — 85018 HEMOGLOBIN: CPT

## 2018-09-11 RX ORDER — SODIUM CHLORIDE, SODIUM LACTATE, POTASSIUM CHLORIDE, CALCIUM CHLORIDE 600; 310; 30; 20 MG/100ML; MG/100ML; MG/100ML; MG/100ML
INJECTION, SOLUTION INTRAVENOUS CONTINUOUS
Status: DISCONTINUED | OUTPATIENT
Start: 2018-09-11 | End: 2018-09-11 | Stop reason: HOSPADM

## 2018-09-11 RX ORDER — SODIUM CHLORIDE 0.9 % (FLUSH) 0.9 %
10 SYRINGE (ML) INJECTION EVERY 12 HOURS SCHEDULED
Status: DISCONTINUED | OUTPATIENT
Start: 2018-09-11 | End: 2018-09-11 | Stop reason: HOSPADM

## 2018-09-11 RX ORDER — HYDROMORPHONE HCL IN 0.9% NACL 0.5 MG/ML
1 SYRINGE (ML) INTRAVENOUS
Status: DISCONTINUED | OUTPATIENT
Start: 2018-09-11 | End: 2018-09-11 | Stop reason: HOSPADM

## 2018-09-11 RX ORDER — ROCURONIUM BROMIDE 10 MG/ML
INJECTION, SOLUTION INTRAVENOUS PRN
Status: DISCONTINUED | OUTPATIENT
Start: 2018-09-11 | End: 2018-09-11 | Stop reason: SDUPTHER

## 2018-09-11 RX ORDER — SODIUM CHLORIDE 9 MG/ML
INJECTION, SOLUTION INTRAVENOUS CONTINUOUS
Status: DISCONTINUED | OUTPATIENT
Start: 2018-09-11 | End: 2018-09-11 | Stop reason: HOSPADM

## 2018-09-11 RX ORDER — DEXAMETHASONE SODIUM PHOSPHATE 10 MG/ML
INJECTION INTRAMUSCULAR; INTRAVENOUS PRN
Status: DISCONTINUED | OUTPATIENT
Start: 2018-09-11 | End: 2018-09-11 | Stop reason: SDUPTHER

## 2018-09-11 RX ORDER — SODIUM CHLORIDE, SODIUM LACTATE, POTASSIUM CHLORIDE, CALCIUM CHLORIDE 600; 310; 30; 20 MG/100ML; MG/100ML; MG/100ML; MG/100ML
INJECTION, SOLUTION INTRAVENOUS CONTINUOUS PRN
Status: DISCONTINUED | OUTPATIENT
Start: 2018-09-11 | End: 2018-09-11 | Stop reason: SDUPTHER

## 2018-09-11 RX ORDER — LIDOCAINE HYDROCHLORIDE 10 MG/ML
INJECTION, SOLUTION INFILTRATION; PERINEURAL PRN
Status: DISCONTINUED | OUTPATIENT
Start: 2018-09-11 | End: 2018-09-11 | Stop reason: SDUPTHER

## 2018-09-11 RX ORDER — ONDANSETRON 2 MG/ML
4 INJECTION INTRAMUSCULAR; INTRAVENOUS EVERY 4 HOURS PRN
Status: DISCONTINUED | OUTPATIENT
Start: 2018-09-11 | End: 2018-09-11 | Stop reason: HOSPADM

## 2018-09-11 RX ORDER — SODIUM CHLORIDE 0.9 % (FLUSH) 0.9 %
10 SYRINGE (ML) INJECTION PRN
Status: DISCONTINUED | OUTPATIENT
Start: 2018-09-11 | End: 2018-09-11 | Stop reason: HOSPADM

## 2018-09-11 RX ORDER — CIPROFLOXACIN 2 MG/ML
INJECTION, SOLUTION INTRAVENOUS PRN
Status: DISCONTINUED | OUTPATIENT
Start: 2018-09-11 | End: 2018-09-11 | Stop reason: SDUPTHER

## 2018-09-11 RX ORDER — CIPROFLOXACIN 2 MG/ML
400 INJECTION, SOLUTION INTRAVENOUS ONCE
Status: DISCONTINUED | OUTPATIENT
Start: 2018-09-11 | End: 2018-09-11

## 2018-09-11 RX ORDER — TAMSULOSIN HYDROCHLORIDE 0.4 MG/1
0.4 CAPSULE ORAL ONCE
Status: COMPLETED | OUTPATIENT
Start: 2018-09-11 | End: 2018-09-11

## 2018-09-11 RX ORDER — OXYCODONE HYDROCHLORIDE AND ACETAMINOPHEN 5; 325 MG/1; MG/1
2 TABLET ORAL EVERY 4 HOURS PRN
Status: DISCONTINUED | OUTPATIENT
Start: 2018-09-11 | End: 2018-09-11 | Stop reason: HOSPADM

## 2018-09-11 RX ORDER — OXYCODONE HYDROCHLORIDE AND ACETAMINOPHEN 5; 325 MG/1; MG/1
1 TABLET ORAL EVERY 8 HOURS PRN
Qty: 30 TABLET | Refills: 0 | Status: SHIPPED | OUTPATIENT
Start: 2018-09-11 | End: 2018-09-18

## 2018-09-11 RX ORDER — ONDANSETRON 2 MG/ML
INJECTION INTRAMUSCULAR; INTRAVENOUS PRN
Status: DISCONTINUED | OUTPATIENT
Start: 2018-09-11 | End: 2018-09-11 | Stop reason: SDUPTHER

## 2018-09-11 RX ORDER — EPHEDRINE SULFATE 50 MG/ML
INJECTION, SOLUTION INTRAVENOUS PRN
Status: DISCONTINUED | OUTPATIENT
Start: 2018-09-11 | End: 2018-09-11 | Stop reason: SDUPTHER

## 2018-09-11 RX ORDER — PROPOFOL 10 MG/ML
INJECTION, EMULSION INTRAVENOUS PRN
Status: DISCONTINUED | OUTPATIENT
Start: 2018-09-11 | End: 2018-09-11 | Stop reason: SDUPTHER

## 2018-09-11 RX ORDER — SUCCINYLCHOLINE CHLORIDE 20 MG/ML
INJECTION INTRAMUSCULAR; INTRAVENOUS PRN
Status: DISCONTINUED | OUTPATIENT
Start: 2018-09-11 | End: 2018-09-11 | Stop reason: SDUPTHER

## 2018-09-11 RX ORDER — FENTANYL CITRATE 50 UG/ML
INJECTION, SOLUTION INTRAMUSCULAR; INTRAVENOUS PRN
Status: DISCONTINUED | OUTPATIENT
Start: 2018-09-11 | End: 2018-09-11 | Stop reason: SDUPTHER

## 2018-09-11 RX ORDER — MIDAZOLAM HYDROCHLORIDE 1 MG/ML
INJECTION INTRAMUSCULAR; INTRAVENOUS PRN
Status: DISCONTINUED | OUTPATIENT
Start: 2018-09-11 | End: 2018-09-11 | Stop reason: SDUPTHER

## 2018-09-11 RX ADMIN — PHENYLEPHRINE HYDROCHLORIDE 40 MCG: 10 INJECTION INTRAVENOUS at 09:28

## 2018-09-11 RX ADMIN — PHENYLEPHRINE HYDROCHLORIDE 80 MCG: 10 INJECTION INTRAVENOUS at 10:05

## 2018-09-11 RX ADMIN — PHENYLEPHRINE HYDROCHLORIDE 80 MCG: 10 INJECTION INTRAVENOUS at 10:01

## 2018-09-11 RX ADMIN — LIDOCAINE HYDROCHLORIDE 50 MG: 10 INJECTION, SOLUTION INFILTRATION; PERINEURAL at 09:12

## 2018-09-11 RX ADMIN — SUGAMMADEX 200 MG: 100 INJECTION, SOLUTION INTRAVENOUS at 10:20

## 2018-09-11 RX ADMIN — CIPROFLOXACIN 400 MG: 2 INJECTION INTRAVENOUS at 09:19

## 2018-09-11 RX ADMIN — ROCURONIUM BROMIDE 10 MG: 10 INJECTION INTRAVENOUS at 09:12

## 2018-09-11 RX ADMIN — EPHEDRINE SULFATE 10 MG: 50 INJECTION, SOLUTION INTRAMUSCULAR; INTRAVENOUS; SUBCUTANEOUS at 09:51

## 2018-09-11 RX ADMIN — PHENYLEPHRINE HYDROCHLORIDE 80 MCG: 10 INJECTION INTRAVENOUS at 09:42

## 2018-09-11 RX ADMIN — FENTANYL CITRATE 50 MCG: 50 INJECTION INTRAMUSCULAR; INTRAVENOUS at 09:15

## 2018-09-11 RX ADMIN — MIDAZOLAM 2 MG: 1 INJECTION INTRAMUSCULAR; INTRAVENOUS at 09:09

## 2018-09-11 RX ADMIN — ROCURONIUM BROMIDE 40 MG: 10 INJECTION INTRAVENOUS at 09:19

## 2018-09-11 RX ADMIN — EPHEDRINE SULFATE 10 MG: 50 INJECTION, SOLUTION INTRAMUSCULAR; INTRAVENOUS; SUBCUTANEOUS at 09:55

## 2018-09-11 RX ADMIN — DEXAMETHASONE SODIUM PHOSPHATE 10 MG: 10 INJECTION INTRAMUSCULAR; INTRAVENOUS at 09:19

## 2018-09-11 RX ADMIN — SODIUM CHLORIDE, SODIUM LACTATE, POTASSIUM CHLORIDE, AND CALCIUM CHLORIDE: 600; 310; 30; 20 INJECTION, SOLUTION INTRAVENOUS at 10:12

## 2018-09-11 RX ADMIN — EPHEDRINE SULFATE 10 MG: 50 INJECTION, SOLUTION INTRAMUSCULAR; INTRAVENOUS; SUBCUTANEOUS at 09:42

## 2018-09-11 RX ADMIN — PHENYLEPHRINE HYDROCHLORIDE 80 MCG: 10 INJECTION INTRAVENOUS at 09:51

## 2018-09-11 RX ADMIN — FENTANYL CITRATE 50 MCG: 50 INJECTION INTRAMUSCULAR; INTRAVENOUS at 09:09

## 2018-09-11 RX ADMIN — TAMSULOSIN HYDROCHLORIDE 0.4 MG: 0.4 CAPSULE ORAL at 10:46

## 2018-09-11 RX ADMIN — ONDANSETRON HYDROCHLORIDE 4 MG: 2 INJECTION, SOLUTION INTRAMUSCULAR; INTRAVENOUS at 10:13

## 2018-09-11 RX ADMIN — PROPOFOL 200 MG: 10 INJECTION, EMULSION INTRAVENOUS at 09:12

## 2018-09-11 RX ADMIN — EPHEDRINE SULFATE 10 MG: 50 INJECTION, SOLUTION INTRAMUSCULAR; INTRAVENOUS; SUBCUTANEOUS at 10:10

## 2018-09-11 RX ADMIN — PHENYLEPHRINE HYDROCHLORIDE 80 MCG: 10 INJECTION INTRAVENOUS at 09:35

## 2018-09-11 RX ADMIN — SODIUM CHLORIDE, SODIUM LACTATE, POTASSIUM CHLORIDE, AND CALCIUM CHLORIDE: 600; 310; 30; 20 INJECTION, SOLUTION INTRAVENOUS at 08:25

## 2018-09-11 RX ADMIN — Medication 140 MG: at 09:12

## 2018-09-11 RX ADMIN — SODIUM CHLORIDE, SODIUM LACTATE, POTASSIUM CHLORIDE, AND CALCIUM CHLORIDE: 600; 310; 30; 20 INJECTION, SOLUTION INTRAVENOUS at 09:09

## 2018-09-11 ASSESSMENT — PAIN - FUNCTIONAL ASSESSMENT: PAIN_FUNCTIONAL_ASSESSMENT: 0-10

## 2018-09-11 ASSESSMENT — PAIN SCALES - GENERAL
PAINLEVEL_OUTOF10: 0

## 2018-09-11 ASSESSMENT — LIFESTYLE VARIABLES: SMOKING_STATUS: 0

## 2018-09-11 NOTE — BRIEF OP NOTE
Urology Brief Op Note    Patient Name: Filipe Bird    : 1960    MRN: [id number]    Pre-operative Diagnosis: LEFT RENAL CALCULUS    Post-operative Diagnosis: Same     Procedure: Procedure(s): Left renal ESWL EXTRACORPEAL SHOCK WAVE LITHOTRIPSY    Anesthesia: General    Surgeon: Chico Vega MD    Assistants: Scrub Person First: Howard Saleem; Mann Vigil    Estimated Blood Loss: * No values recorded between 2018  9:09 AM and 2018 35:70 AM *    Complications: none    Findings: 3000 shockwaves, power level 1-7 excellent fragmentation stone    Specimens:  * No specimens in log *    Disposition/Plan: To PACU in stable condition. Then to [facility] out patient.   Follow-up in 2 weeks with Virgie Pete MD  2018  10:25 AM

## 2018-09-11 NOTE — ANESTHESIA PRE PROCEDURE
List   Diagnosis Code    Kidney stone on left side N20.0    Benign prostatic hyperplasia with urinary obstruction N40.1, N13.8    Sepsis (HCC) A41.9    BETTY (acute kidney injury) ([facility] N17.9    Epididymoorchitis N45.3       Past Medical History:        Diagnosis Date    Anemia     BPH (benign prostatic hyperplasia)     GERD (gastroesophageal reflux disease)     Gross hematuria     Hyperlipidemia     Hypertension     Renal calculi        Past Surgical History:        Procedure Laterality Date    CYSTOSCOPY         Social History:    Social History   Substance Use Topics    Smoking status: Never Smoker    Smokeless tobacco: Never Used    Alcohol use No                                Counseling given: Not Answered      Vital Signs (Current):   Vitals:    09/11/18 0648   BP: (!) 178/100   Pulse: 72   Resp: 14   Temp: 97.7 °F (36.5 °C)   TempSrc: Tympanic   SpO2: 97%   Weight: 202 lb (91.6 kg)   Height: 6' 2\" (1.88 m)                                              BP Readings from Last 3 Encounters:   09/11/18 (!) 178/100   09/06/18 132/72   08/23/18 (!) 145/86       NPO Status: Time of last liquid consumption: 1800                        Time of last solid consumption: 1800                        Date of last liquid consumption: 09/10/18                        Date of last solid food consumption: 09/10/18    BMI:   Wt Readings from Last 3 Encounters:   09/11/18 202 lb (91.6 kg)   09/06/18 209 lb (94.8 kg)   08/18/18 216 lb 9.6 oz (98.2 kg)     Body mass index is 25.94 kg/m².     CBC:   Lab Results   Component Value Date    WBC 8.7 08/23/2018    RBC 3.62 08/23/2018    HGB 9.8 08/23/2018    HCT 30.8 08/23/2018    MCV 85.1 08/23/2018    RDW 13.7 08/23/2018     08/23/2018       CMP:   Lab Results   Component Value Date     08/23/2018    K 4.2 08/23/2018    K 3.9 08/21/2018     08/23/2018    CO2 26 08/23/2018    BUN 9 08/23/2018    CREATININE 0.9 08/23/2018    LABGLOM >60 08/23/2018    GLUCOSE 93 08/23/2018    PROT 6.3 08/23/2018    CALCIUM 8.8 08/23/2018    BILITOT <0.2 08/23/2018    ALKPHOS 68 08/23/2018    AST 32 08/23/2018    ALT 49 08/23/2018       POC Tests: No results for input(s): POCGLU, POCNA, POCK, POCCL, POCBUN, POCHEMO, POCHCT in the last 72 hours. Coags: No results found for: PROTIME, INR, APTT    HCG (If Applicable): No results found for: PREGTESTUR, PREGSERUM, HCG, HCGQUANT     ABGs: No results found for: PHART, PO2ART, RVT8WPQ, TUX6YVW, BEART, K4XSLHWR     Type & Screen (If Applicable):  No results found for: LABABO, [address]    Anesthesia Evaluation  Patient summary reviewed and Nursing notes reviewed no history of anesthetic complications:   Airway: Mallampati: I  TM distance: >3 FB   Neck ROM: full  Mouth opening: > = 3 FB Dental:          Pulmonary:normal exam        (-) not a current smoker                           Cardiovascular:    (+) hypertension:, hyperlipidemia      ECG reviewed               Beta Blocker:  Not on Beta Blocker         Neuro/Psych:   Negative Neuro/Psych ROS              GI/Hepatic/Renal:   (+) GERD: well controlled, renal disease: kidney stones,           Endo/Other:        (-) diabetes mellitus               Abdominal:           Vascular: negative vascular ROS. Anesthesia Plan      general     ASA 2       Induction: intravenous. MIPS: Postoperative opioids intended and Prophylactic antiemetics administered. Anesthetic plan and risks discussed with patient. Plan discussed with CRNA.                   Jairo Oneal MD   9/11/2018

## 2018-09-11 NOTE — ANESTHESIA POSTPROCEDURE EVALUATION
Department of Anesthesiology  Postprocedure Note    Patient: Juan J Newman  MRN: [id number]  YOB: 1960  Date of evaluation: 9/11/2018  Time:  10:29 AM     Procedure Summary     Date:  09/11/18 Room / Location:  [facility] OR  / [facility] OR    Anesthesia Start:  1399 Anesthesia Stop:  9457    Procedure:  ESWL EXTRACORPEAL SHOCK WAVE LITHOTRIPSY (Left ) Diagnosis:  (LEFT RENAL CALCULUS)    Surgeon:  Sushila Polk MD Responsible Provider: LORETA Schulte CRNA    Anesthesia Type:  general ASA Status:  2          Anesthesia Type: general    Lance Phase I:      Lance Phase II:      Last vitals: Reviewed and per EMR flowsheets.        Anesthesia Post Evaluation    Patient location during evaluation: PACU  Patient participation: complete - patient participated  Level of consciousness: awake and alert  Pain score: 0  Airway patency: patent  Nausea & Vomiting: no vomiting and no nausea  Complications: no  Cardiovascular status: hemodynamically stable  Respiratory status: spontaneous ventilation and nasal cannula  Hydration status: stable

## 2018-09-11 NOTE — H&P (VIEW-ONLY)
Progress Notes  Encounter Date: 9/6/2018  Raven Wharton PA-C   Physician Assistant   Expand All Collapse All    []Manual[]Template  []Copied  Ron Harris is a 66 y.o. male who presents today        Chief Complaint   Patient presents with    Follow-up       2 week hospital follow up  kidney stone       HPI:  Left kidney stone:  Patient is a 57-year-old gentleman who is seen in the hospital initially for sepsis related to severe epididymal orchitis acute kidney injury which has improved. He still taking 750 mg Levaquin which she was sent home with per recommendations from infectious disease. He was discharged from the hospital 08/23/18. In the course of his evaluation CT scan showed an approximate 9 mm stone in the left kidney. Patient is already on the schedule for next Tuesday. I had the patient go get a KUB the stone is easily seen on the KUB image today. He is not taking any blood thinning medicines aspirin Aleve ibuprofen. Is not had any fever or chills he has some firmness still on the left side however this is markedly improved from when he was in the hospital and after taking antibiotic.     Epididymal orchitis: Patient who developed severe epididymal orchitis in the septic after IV antibiotics and oral antibiotics he has recovered nicely. Last some tenderness on the left side the swelling is still there and firmness but much improved from previous. He is on 750 mg Levaquin which he does not complete until 09/13/18.  His urine was yellow clear it was positive for red blood cells but is not infected looking.         Past Medical History        Past Medical History:   Diagnosis Date    Anemia      BPH (benign prostatic hyperplasia)      GERD (gastroesophageal reflux disease)      Gross hematuria      Hyperlipidemia      Hypertension      Renal calculi              Past Surgical History         Past Surgical History:   Procedure Laterality Date    CYSTOSCOPY           Current Facility-Administered Medications          Current Outpatient Prescriptions   Medication Sig Dispense Refill    finasteride (PROSCAR) 5 MG tablet Take 1 tablet by mouth daily 30 tablet 3    tamsulosin (FLOMAX) 0.4 MG capsule Take 2 capsules by mouth daily 30 capsule 3    ferrous sulfate 325 (65 Fe) MG tablet Take 1 tablet by mouth 2 times daily (with meals) 30 tablet 3    levofloxacin (LEVAQUIN) 750 MG tablet Take 1 tablet by mouth daily for 21 days 21 tablet 0    amLODIPine (NORVASC) 10 MG tablet Take 10 mg by mouth daily        finasteride (PROSCAR) 5 MG tablet Take 5 mg by mouth daily        lisinopril (PRINIVIL;ZESTRIL) 10 MG tablet Take 20 mg by mouth daily         tamsulosin (FLOMAX) 0.4 MG capsule Take 1 capsule by mouth daily 30 capsule 3      No current facility-administered medications for this visit.                   Allergies   Allergen Reactions    Lipitor [Atorvastatin] Shortness Of Breath       Chest pain    Ibuprofen         Elevates his liver enzymes    Lactose Intolerance (Gi)           Social History   Social History            Social History    Marital status: Single       Spouse name: N/A    Number of children: N/A    Years of education: N/A           Social History Main Topics    Smoking status: Never Smoker    Smokeless tobacco: Never Used    Alcohol use No    Drug use: No    Sexual activity: Not Asked           Other Topics Concern    None          Social History Narrative    None            Family History         Family History   Problem Relation Age of Onset    High Blood Pressure Mother      Prostate Cancer Sister              REVIEW OF SYSTEMS:  Review of Systems   Constitutional: Negative for malaise/fatigue and weight loss. HENT: Negative for nosebleeds and sinus pain. Eyes: Negative for blurred vision and pain. Respiratory: Negative for shortness of breath and wheezing. Cardiovascular: Negative for palpitations and leg swelling. Gastrointestinal: Negative for abdominal pain and vomiting. Genitourinary: Positive for flank pain. Negative for dysuria, frequency, hematuria and urgency. Musculoskeletal: Negative for back pain and myalgias. Skin: Negative for itching and rash. Neurological: Negative for tremors and sensory change. Endo/Heme/Allergies: Negative for environmental allergies and polydipsia. Psychiatric/Behavioral: Negative for hallucinations. The patient is not nervous/anxious.          PHYSICAL EXAM:  /72   Pulse 92   Temp 96.1 °F (35.6 °C) (Temporal)   Ht 6' 2\" (1.88 m)   Wt 209 lb (94.8 kg)   BMI 26.83 kg/m²   Physical Exam   Constitutional: No distress. HENT:   Mouth/Throat: No oropharyngeal exudate. Eyes: Left eye exhibits no discharge. No scleral icterus. Neck: No JVD present. No tracheal deviation present. No thyromegaly present. Cardiovascular: Exam reveals no gallop and no friction rub. Pulmonary/Chest: No stridor. He has no rales. Abdominal: He exhibits no distension and no mass. There is no rebound and no guarding. Genitourinary:         Musculoskeletal: He exhibits no edema. Neurological: No cranial nerve deficit. He exhibits normal muscle tone. Coordination normal.   Skin: He is not diaphoretic.  No pallor.               DATA:  U/A:          Lab Results   Component Value Date     NITRITE neg 08/13/2018     COLORU Yellow 09/06/2018     PROTEINU Negative 09/06/2018     PHUR 7.0 09/06/2018     LABCAST 2-4 Hyaline 08/16/2018     WBCUA 3-5 08/16/2018     RBCUA 2-4 08/16/2018     MUCUS 1+ 08/16/2018     BACTERIA trace 08/16/2018     CLARITYU Clear 09/06/2018     SPECGRAV 1.020 09/06/2018     LEUKOCYTESUR neg 09/06/2018     LEUKOCYTESUR SMALL 08/16/2018     UROBILINOGEN Normal 09/06/2018     BILIRUBINUR 0 09/06/2018     BILIRUBINUR 0 08/13/2018     BLOODU Positive 09/06/2018     GLUCOSEU neg 09/06/2018            Imaging:  FINDINGS:   There is a nonobstructive bowel gas pattern moderate amount of stool   is noted in the colon. . A persistent calcification is present   projecting over the upper pole the left kidney. .   Evaluation for free intraperitoneal air is limited on a supine   radiograph, but there are no definite findings of free intraperitoneal   air. 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 There is subsequently levocurvature. .             1. Kidney stone on left side  The stone is easily seen on KUB he is on the schedule for ESWL this coming Tuesday with Dr. Silas Arcos. I reviewed medicines he should not take prior to the as well he is not on any blood thinning medicine he does not take aspirin Aleve ibuprofen or any obvious medicines that would cause alterations platelet function. I wrote orders for patient to have daily Ensure to make sure he has adequate nutritional coverage.      2. Epididymoorchitis  Still was some mild residual swelling and firmness however much improved from when he was in the hospital he completes his antibiotic next Friday I told him to continue until complete.     3. BPH with obstruction/lower urinary tract symptoms  Per the patient is voiding symptoms have improved since hospitalization however he is on maximal medical therapy and would benefit from a TURP which can be discussed in the future.            Orders Placed This Encounter   Procedures    POCT Urinalysis Dipstick w/ Micro (Auto)        Encounter Medications    No orders of the defined types were placed in this encounter.      Plan:  Plan for left ESWL next Tuesday as scheduled.           Office Visit on 9/6/2018            Revision History            Detailed Report           Note shared with patient   Progress Notes Info     Author Note Status Last Update User   Andrew Vega PA-C Signed Arlene Lee   Last Update Date/Time: 9/6/2018  4:02 PM   Chart Review Routing History     No Routing History on File

## 2018-09-25 ENCOUNTER — OFFICE VISIT (OUTPATIENT)
Dept: UROLOGY | Age: 58
End: 2018-09-25
Payer: COMMERCIAL

## 2018-09-25 ENCOUNTER — HOSPITAL ENCOUNTER (OUTPATIENT)
Dept: GENERAL RADIOLOGY | Age: 58
Discharge: HOME | End: 2018-09-25
Payer: COMMERCIAL

## 2018-09-25 VITALS
SYSTOLIC BLOOD PRESSURE: 131 MMHG | BODY MASS INDEX: 27.08 KG/M2 | DIASTOLIC BLOOD PRESSURE: 85 MMHG | WEIGHT: 211 LBS | HEIGHT: 74 IN | TEMPERATURE: 95.7 F

## 2018-09-25 DIAGNOSIS — N20.0 KIDNEY STONE ON LEFT SIDE: ICD-10-CM

## 2018-09-25 DIAGNOSIS — N20.0 KIDNEY STONE ON LEFT SIDE: Primary | ICD-10-CM

## 2018-09-25 LAB
BILIRUBIN, POC: 0
BLOOD URINE, POC: NORMAL
CLARITY, POC: CLEAR
COLOR, POC: YELLOW
GLUCOSE URINE, POC: NORMAL
KETONES, POC: NORMAL
LEUKOCYTE EST, POC: NORMAL
NITRITE, POC: NORMAL
PH, POC: 6.5
PROTEIN, POC: NORMAL
SPECIFIC GRAVITY, POC: 1.02
UROBILINOGEN, POC: 0.2

## 2018-09-25 PROCEDURE — 81003 URINALYSIS AUTO W/O SCOPE: CPT | Performed by: PHYSICIAN ASSISTANT

## 2018-09-25 PROCEDURE — 99024 POSTOP FOLLOW-UP VISIT: CPT | Performed by: PHYSICIAN ASSISTANT

## 2018-09-25 PROCEDURE — 74018 RADEX ABDOMEN 1 VIEW: CPT

## 2018-09-25 RX ORDER — FINASTERIDE 5 MG/1
5 TABLET, FILM COATED ORAL DAILY
Qty: 30 TABLET | Refills: 3 | Status: ON HOLD | OUTPATIENT
Start: 2018-09-25 | End: 2018-12-05 | Stop reason: HOSPADM

## 2018-09-25 ASSESSMENT — ENCOUNTER SYMPTOMS
BACK PAIN: 0
SINUS PAIN: 0
BLURRED VISION: 0
EYE PAIN: 0
WHEEZING: 0
ABDOMINAL PAIN: 0
VOMITING: 0
SHORTNESS OF BREATH: 0

## 2018-09-25 NOTE — LETTER
[facility]  [address] [place] [address], [address]  [address] [id number]  Phone: [phone]  Fax: [phone]    Rohit Ugalde        September 25, 2018     Patient: Jessica Jamison   YOB: 1960   Date of Visit: 9/25/2018       To Whom It May Concern: It is my medical opinion that Jesseardine Shaheen may return to full duty immediately with no restrictions. If you have any questions or concerns, please don't hesitate to call.     Sincerely,        Sp Mosqueda PA-C

## 2018-09-25 NOTE — PROGRESS NOTES
facility-administered medications for this visit. Allergies   Allergen Reactions    Lipitor [Atorvastatin] Shortness Of Breath     Chest pain    Ibuprofen      Elevates his liver enzymes    Lactose Intolerance (Gi)        Social History     Social History    Marital status: Single     Spouse name: N/A    Number of children: N/A    Years of education: N/A     Social History Main Topics    Smoking status: Never Smoker    Smokeless tobacco: Never Used    Alcohol use No    Drug use: No    Sexual activity: Not Asked     Other Topics Concern    None     Social History Narrative    None       Family History   Problem Relation Age of Onset    High Blood Pressure Mother     Prostate Cancer Sister        REVIEW OF SYSTEMS:  Review of Systems   Constitutional: Negative for malaise/fatigue and weight loss. HENT: Negative for nosebleeds and sinus pain. Eyes: Negative for blurred vision and pain. Respiratory: Negative for shortness of breath and wheezing. Cardiovascular: Negative for palpitations and leg swelling. Gastrointestinal: Negative for abdominal pain and vomiting. Genitourinary: Negative for dysuria, flank pain, frequency, hematuria and urgency. Musculoskeletal: Negative for back pain and myalgias. Skin: Negative for itching and rash. Neurological: Negative for tremors and sensory change. Endo/Heme/Allergies: Negative for environmental allergies and polydipsia. Psychiatric/Behavioral: Negative for hallucinations. The patient is not nervous/anxious. PHYSICAL EXAM:  /85   Temp 95.7 °F (35.4 °C) (Temporal)   Ht 6' 2\" (1.88 m)   Wt 211 lb (95.7 kg)   BMI 27.09 kg/m²   Physical Exam   Constitutional: No distress. HENT:   Mouth/Throat: No oropharyngeal exudate. Eyes: Left eye exhibits no discharge. No scleral icterus. Neck: No JVD present. No tracheal deviation present. No thyromegaly present. Cardiovascular: Exam reveals no gallop and no friction rub. Pulmonary/Chest: No stridor. He has no rales. Abdominal: He exhibits no distension and no mass. There is no rebound and no guarding. Musculoskeletal: He exhibits no edema. Neurological: No cranial nerve deficit. He exhibits normal muscle tone. Coordination normal.   Skin: He is not diaphoretic. No pallor. DATA:  U/A:    Lab Results   Component Value Date    NITRITE neg 09/25/2018    COLORU yellow 09/25/2018    COLORU Yellow 09/06/2018    PROTEINU neg 09/25/2018    PROTEINU Negative 09/06/2018    PHUR 6.5 09/25/2018    PHUR 7.0 09/06/2018    LABCAST 2-4 Hyaline 08/16/2018    WBCUA 3-5 08/16/2018    RBCUA 2-4 08/16/2018    MUCUS 1+ 08/16/2018    BACTERIA trace 08/16/2018    CLARITYU clear 09/25/2018    CLARITYU Clear 09/06/2018    SPECGRAV 1.020 09/25/2018    SPECGRAV 1.020 09/06/2018    LEUKOCYTESUR neg 09/25/2018    LEUKOCYTESUR SMALL 08/16/2018    UROBILINOGEN Normal 09/06/2018    BILIRUBINUR 0 09/25/2018    BLOODU neg 09/25/2018    BLOODU Positive 09/06/2018    GLUCOSEU neg 09/25/2018    GLUCOSEU neg 09/06/2018         Imaging:  EXAMINATION: KUB radiograph 9/25/2018   HISTORY: Kidney stone on left side   FINDINGS: EKG radiograph demonstrates mild constipation. A previously   noted stone projecting over the upper pole of the left kidney is no   longer visualized but could potentially be obscured by overlying bowel   gas. There is no evidence of a discrete ureteral calculus. There are   multiple phleboliths within the pelvis.       Impression   . Previously noted left-sided renal calculus is no longer   visualized. There is moderate constipation. Signed by Dr Renita Stallings on 9/25/2018 12:26 PM         1. Kidney stone on left side  I no longer see the stone that was treated 09/11/18 by Dr. Yesica Baca. He is doing well with no symptoms. He was not able to catch anything in the strainer he said he saw small particles with a sifted through the strainer.  - POCT Urinalysis No Micro (Auto)    2.  BPH with

## 2018-10-09 ENCOUNTER — TELEPHONE (OUTPATIENT)
Dept: UROLOGY | Age: 58
End: 2018-10-09

## 2018-10-10 ENCOUNTER — TELEPHONE (OUTPATIENT)
Dept: UROLOGY | Age: 58
End: 2018-10-10

## 2018-10-29 ENCOUNTER — OFFICE VISIT (OUTPATIENT)
Dept: UROLOGY | Age: 58
End: 2018-10-29
Payer: COMMERCIAL

## 2018-10-29 VITALS — BODY MASS INDEX: 27.34 KG/M2 | WEIGHT: 213 LBS | HEIGHT: 74 IN | TEMPERATURE: 96.7 F

## 2018-10-29 DIAGNOSIS — N40.1 BPH WITH OBSTRUCTION/LOWER URINARY TRACT SYMPTOMS: Primary | ICD-10-CM

## 2018-10-29 DIAGNOSIS — N13.8 BPH WITH OBSTRUCTION/LOWER URINARY TRACT SYMPTOMS: Primary | ICD-10-CM

## 2018-10-29 LAB
BILIRUBIN, POC: NORMAL
BLOOD URINE, POC: NORMAL
CLARITY, POC: CLEAR
COLOR, POC: YELLOW
GLUCOSE URINE, POC: NORMAL
KETONES, POC: NORMAL
LEUKOCYTE EST, POC: NORMAL
NITRITE, POC: NORMAL
PH, POC: 6
PROTEIN, POC: NORMAL
SPECIFIC GRAVITY, POC: 1.02
UROBILINOGEN, POC: 0.2

## 2018-10-29 PROCEDURE — 99214 OFFICE O/P EST MOD 30 MIN: CPT | Performed by: UROLOGY

## 2018-10-29 PROCEDURE — 81003 URINALYSIS AUTO W/O SCOPE: CPT | Performed by: UROLOGY

## 2018-10-29 ASSESSMENT — ENCOUNTER SYMPTOMS
EYE REDNESS: 0
CONSTIPATION: 0
COUGH: 0
ABDOMINAL PAIN: 0
VOMITING: 0
EYE DISCHARGE: 0
SHORTNESS OF BREATH: 0
BLOOD IN STOOL: 0
SINUS PRESSURE: 0
ABDOMINAL DISTENTION: 0
COLOR CHANGE: 0
WHEEZING: 0
EYE PAIN: 0
FACIAL SWELLING: 0
SORE THROAT: 0
NAUSEA: 0
RHINORRHEA: 0
DIARRHEA: 0
BACK PAIN: 0

## 2018-10-29 NOTE — PROGRESS NOTES
Carol Lopez is a 66 y.o. male who presents today   Chief Complaint   Patient presents with    Follow-up     I'm here to get scheduled for a TURP       Benign Prostatic Hypertrophy  Patient complains of lower urinary tract symptoms. He reports incomplete emptying, intermittency, nocturia two times a night and weak stream. He denies straining. Patient states symptoms are of moderate severity. Onset of symptoms was a few years ago and was gradual in onset. He has no personal history and no family history of prostate cancer. He reports a history of gross hematuria and kidney stones. He denies flank pain and recurrent UTI. Initially presented with severe obstructive voiding symptoms and gross hematuria. He was inserted on maximum medical therapy with finasteride and tamsulosin he's had some improvement. He was admitted after a cystoscopy for an episode of prostatitis. Cystoscopy done on August 13 showed 3+ trilobar prostatic enlargement with a large ball-valve type median lobe with intravesical protrusion. He was offered surgical treatment and he is elected to proceed.       Past Medical History:   Diagnosis Date    Anemia     BPH (benign prostatic hyperplasia)     GERD (gastroesophageal reflux disease)     Gross hematuria     Hyperlipidemia     Hypertension     Renal calculi        Past Surgical History:   Procedure Laterality Date    CYSTOSCOPY      [place] FRAGMENT KIDNEY STONE/ ESWL Left 9/11/2018    ESWL EXTRACORPEAL SHOCK WAVE LITHOTRIPSY performed by Natalia Sears MD at [address] [place] [address] OR       Current Outpatient Prescriptions   Medication Sig Dispense Refill    finasteride (PROSCAR) 5 MG tablet Take 1 tablet by mouth daily 30 tablet 3    tamsulosin (FLOMAX) 0.4 MG capsule Take 2 capsules by mouth daily 30 capsule 3    ferrous sulfate 325 (65 Fe) MG tablet Take 1 tablet by mouth 2 times daily (with meals) 30 tablet 3    amLODIPine (NORVASC) 10 MG tablet Take 10 mg by mouth daily      lisinopril

## 2018-11-05 ENCOUNTER — TELEPHONE (OUTPATIENT)
Dept: UROLOGY | Age: 58
End: 2018-11-05

## 2018-12-04 ENCOUNTER — ANESTHESIA EVENT (OUTPATIENT)
Dept: OPERATING ROOM | Age: 58
End: 2018-12-04
Payer: COMMERCIAL

## 2018-12-04 ENCOUNTER — ANESTHESIA (OUTPATIENT)
Dept: OPERATING ROOM | Age: 58
End: 2018-12-04
Payer: COMMERCIAL

## 2018-12-04 ENCOUNTER — HOSPITAL ENCOUNTER (OUTPATIENT)
Age: 58
Setting detail: OBSERVATION
Discharge: HOME | End: 2018-12-05
Attending: UROLOGY | Admitting: UROLOGY
Payer: COMMERCIAL

## 2018-12-04 VITALS
TEMPERATURE: 96 F | SYSTOLIC BLOOD PRESSURE: 106 MMHG | OXYGEN SATURATION: 100 % | RESPIRATION RATE: 13 BRPM | DIASTOLIC BLOOD PRESSURE: 65 MMHG

## 2018-12-04 DIAGNOSIS — G89.18 POST-OP PAIN: Primary | ICD-10-CM

## 2018-12-04 PROBLEM — Z90.79 S/P TRANSURETHRAL RESECTION OF PROSTATE: Status: ACTIVE | Noted: 2018-12-04

## 2018-12-04 PROBLEM — N13.8 BPH WITH URINARY OBSTRUCTION: Status: ACTIVE | Noted: 2018-12-04

## 2018-12-04 PROBLEM — N40.1 BPH WITH URINARY OBSTRUCTION: Status: ACTIVE | Noted: 2018-12-04

## 2018-12-04 LAB
ABO/RH: NORMAL
ANTIBODY SCREEN: NORMAL

## 2018-12-04 PROCEDURE — 2580000003 HC RX 258: Performed by: UROLOGY

## 2018-12-04 PROCEDURE — 86850 RBC ANTIBODY SCREEN: CPT

## 2018-12-04 PROCEDURE — 86900 BLOOD TYPING SEROLOGIC ABO: CPT

## 2018-12-04 PROCEDURE — 2720000010 HC SURG SUPPLY STERILE: Performed by: UROLOGY

## 2018-12-04 PROCEDURE — 3700000000 HC ANESTHESIA ATTENDED CARE: Performed by: UROLOGY

## 2018-12-04 PROCEDURE — G0378 HOSPITAL OBSERVATION PER HR: HCPCS

## 2018-12-04 PROCEDURE — 3700000001 HC ADD 15 MINUTES (ANESTHESIA): Performed by: UROLOGY

## 2018-12-04 PROCEDURE — 6370000000 HC RX 637 (ALT 250 FOR IP): Performed by: UROLOGY

## 2018-12-04 PROCEDURE — 7100000001 HC PACU RECOVERY - ADDTL 15 MIN: Performed by: UROLOGY

## 2018-12-04 PROCEDURE — 94664 DEMO&/EVAL PT USE INHALER: CPT

## 2018-12-04 PROCEDURE — 7100000000 HC PACU RECOVERY - FIRST 15 MIN: Performed by: UROLOGY

## 2018-12-04 PROCEDURE — 2709999900 HC NON-CHARGEABLE SUPPLY: Performed by: UROLOGY

## 2018-12-04 PROCEDURE — 86901 BLOOD TYPING SEROLOGIC RH(D): CPT

## 2018-12-04 PROCEDURE — 3600000004 HC SURGERY LEVEL 4 BASE: Performed by: UROLOGY

## 2018-12-04 PROCEDURE — 36415 COLL VENOUS BLD VENIPUNCTURE: CPT

## 2018-12-04 PROCEDURE — 3600000014 HC SURGERY LEVEL 4 ADDTL 15MIN: Performed by: UROLOGY

## 2018-12-04 PROCEDURE — 6360000002 HC RX W HCPCS: Performed by: NURSE ANESTHETIST, CERTIFIED REGISTERED

## 2018-12-04 PROCEDURE — 52601 PROSTATECTOMY (TURP): CPT | Performed by: UROLOGY

## 2018-12-04 PROCEDURE — 88305 TISSUE EXAM BY PATHOLOGIST: CPT

## 2018-12-04 PROCEDURE — 2500000003 HC RX 250 WO HCPCS: Performed by: NURSE ANESTHETIST, CERTIFIED REGISTERED

## 2018-12-04 PROCEDURE — 2580000003 HC RX 258: Performed by: NURSE ANESTHETIST, CERTIFIED REGISTERED

## 2018-12-04 RX ORDER — MORPHINE SULFATE 2 MG/ML
2 INJECTION, SOLUTION INTRAMUSCULAR; INTRAVENOUS EVERY 5 MIN PRN
Status: DISCONTINUED | OUTPATIENT
Start: 2018-12-04 | End: 2018-12-04

## 2018-12-04 RX ORDER — METOCLOPRAMIDE HYDROCHLORIDE 5 MG/ML
10 INJECTION INTRAMUSCULAR; INTRAVENOUS
Status: DISCONTINUED | OUTPATIENT
Start: 2018-12-04 | End: 2018-12-04

## 2018-12-04 RX ORDER — FENTANYL CITRATE 50 UG/ML
50 INJECTION, SOLUTION INTRAMUSCULAR; INTRAVENOUS
Status: DISCONTINUED | OUTPATIENT
Start: 2018-12-04 | End: 2018-12-04

## 2018-12-04 RX ORDER — AMLODIPINE BESYLATE 10 MG/1
10 TABLET ORAL DAILY
Status: DISCONTINUED | OUTPATIENT
Start: 2018-12-04 | End: 2018-12-05 | Stop reason: HOSPADM

## 2018-12-04 RX ORDER — ONDANSETRON 2 MG/ML
4 INJECTION INTRAMUSCULAR; INTRAVENOUS EVERY 4 HOURS PRN
Status: DISCONTINUED | OUTPATIENT
Start: 2018-12-04 | End: 2018-12-05 | Stop reason: HOSPADM

## 2018-12-04 RX ORDER — ACETAMINOPHEN 500 MG
1000 TABLET ORAL EVERY 6 HOURS PRN
Status: DISCONTINUED | OUTPATIENT
Start: 2018-12-04 | End: 2018-12-05 | Stop reason: HOSPADM

## 2018-12-04 RX ORDER — LIDOCAINE HYDROCHLORIDE 10 MG/ML
INJECTION, SOLUTION INFILTRATION; PERINEURAL PRN
Status: DISCONTINUED | OUTPATIENT
Start: 2018-12-04 | End: 2018-12-04 | Stop reason: SDUPTHER

## 2018-12-04 RX ORDER — LABETALOL HYDROCHLORIDE 5 MG/ML
5 INJECTION, SOLUTION INTRAVENOUS EVERY 10 MIN PRN
Status: DISCONTINUED | OUTPATIENT
Start: 2018-12-04 | End: 2018-12-04

## 2018-12-04 RX ORDER — MORPHINE SULFATE 4 MG/ML
4 INJECTION, SOLUTION INTRAMUSCULAR; INTRAVENOUS
Status: DISCONTINUED | OUTPATIENT
Start: 2018-12-04 | End: 2018-12-04

## 2018-12-04 RX ORDER — PROPOFOL 10 MG/ML
INJECTION, EMULSION INTRAVENOUS PRN
Status: DISCONTINUED | OUTPATIENT
Start: 2018-12-04 | End: 2018-12-04 | Stop reason: SDUPTHER

## 2018-12-04 RX ORDER — SODIUM CHLORIDE 0.9 % (FLUSH) 0.9 %
10 SYRINGE (ML) INJECTION EVERY 12 HOURS SCHEDULED
Status: DISCONTINUED | OUTPATIENT
Start: 2018-12-04 | End: 2018-12-05 | Stop reason: HOSPADM

## 2018-12-04 RX ORDER — SODIUM CHLORIDE 9 MG/ML
INJECTION, SOLUTION INTRAVENOUS CONTINUOUS
Status: DISCONTINUED | OUTPATIENT
Start: 2018-12-04 | End: 2018-12-05 | Stop reason: HOSPADM

## 2018-12-04 RX ORDER — ENALAPRILAT 2.5 MG/2ML
1.25 INJECTION INTRAVENOUS
Status: DISCONTINUED | OUTPATIENT
Start: 2018-12-04 | End: 2018-12-04

## 2018-12-04 RX ORDER — OXYCODONE HYDROCHLORIDE AND ACETAMINOPHEN 5; 325 MG/1; MG/1
2 TABLET ORAL EVERY 4 HOURS PRN
Status: DISCONTINUED | OUTPATIENT
Start: 2018-12-04 | End: 2018-12-05 | Stop reason: HOSPADM

## 2018-12-04 RX ORDER — SODIUM CHLORIDE, SODIUM LACTATE, POTASSIUM CHLORIDE, CALCIUM CHLORIDE 600; 310; 30; 20 MG/100ML; MG/100ML; MG/100ML; MG/100ML
INJECTION, SOLUTION INTRAVENOUS CONTINUOUS PRN
Status: DISCONTINUED | OUTPATIENT
Start: 2018-12-04 | End: 2018-12-04 | Stop reason: SDUPTHER

## 2018-12-04 RX ORDER — LIDOCAINE HYDROCHLORIDE 10 MG/ML
1 INJECTION, SOLUTION EPIDURAL; INFILTRATION; INTRACAUDAL; PERINEURAL
Status: DISCONTINUED | OUTPATIENT
Start: 2018-12-04 | End: 2018-12-04

## 2018-12-04 RX ORDER — SODIUM CHLORIDE 0.9 % (FLUSH) 0.9 %
10 SYRINGE (ML) INJECTION EVERY 12 HOURS SCHEDULED
Status: DISCONTINUED | OUTPATIENT
Start: 2018-12-04 | End: 2018-12-04

## 2018-12-04 RX ORDER — HYDRALAZINE HYDROCHLORIDE 20 MG/ML
5 INJECTION INTRAMUSCULAR; INTRAVENOUS EVERY 10 MIN PRN
Status: DISCONTINUED | OUTPATIENT
Start: 2018-12-04 | End: 2018-12-04

## 2018-12-04 RX ORDER — SUCCINYLCHOLINE CHLORIDE 20 MG/ML
INJECTION INTRAMUSCULAR; INTRAVENOUS PRN
Status: DISCONTINUED | OUTPATIENT
Start: 2018-12-04 | End: 2018-12-04 | Stop reason: SDUPTHER

## 2018-12-04 RX ORDER — MEPERIDINE HYDROCHLORIDE 50 MG/ML
12.5 INJECTION INTRAMUSCULAR; INTRAVENOUS; SUBCUTANEOUS EVERY 5 MIN PRN
Status: DISCONTINUED | OUTPATIENT
Start: 2018-12-04 | End: 2018-12-04

## 2018-12-04 RX ORDER — SODIUM CHLORIDE 0.9 % (FLUSH) 0.9 %
10 SYRINGE (ML) INJECTION PRN
Status: DISCONTINUED | OUTPATIENT
Start: 2018-12-04 | End: 2018-12-05 | Stop reason: HOSPADM

## 2018-12-04 RX ORDER — FENTANYL CITRATE 50 UG/ML
INJECTION, SOLUTION INTRAMUSCULAR; INTRAVENOUS PRN
Status: DISCONTINUED | OUTPATIENT
Start: 2018-12-04 | End: 2018-12-04 | Stop reason: SDUPTHER

## 2018-12-04 RX ORDER — ROCURONIUM BROMIDE 10 MG/ML
INJECTION, SOLUTION INTRAVENOUS PRN
Status: DISCONTINUED | OUTPATIENT
Start: 2018-12-04 | End: 2018-12-04 | Stop reason: SDUPTHER

## 2018-12-04 RX ORDER — DEXAMETHASONE SODIUM PHOSPHATE 10 MG/ML
INJECTION INTRAMUSCULAR; INTRAVENOUS PRN
Status: DISCONTINUED | OUTPATIENT
Start: 2018-12-04 | End: 2018-12-04 | Stop reason: SDUPTHER

## 2018-12-04 RX ORDER — LISINOPRIL 20 MG/1
20 TABLET ORAL DAILY
Status: DISCONTINUED | OUTPATIENT
Start: 2018-12-04 | End: 2018-12-05 | Stop reason: HOSPADM

## 2018-12-04 RX ORDER — PROMETHAZINE HYDROCHLORIDE 25 MG/ML
6.25 INJECTION, SOLUTION INTRAMUSCULAR; INTRAVENOUS
Status: DISCONTINUED | OUTPATIENT
Start: 2018-12-04 | End: 2018-12-04

## 2018-12-04 RX ORDER — SCOLOPAMINE TRANSDERMAL SYSTEM 1 MG/1
1 PATCH, EXTENDED RELEASE TRANSDERMAL ONCE
Status: DISCONTINUED | OUTPATIENT
Start: 2018-12-04 | End: 2018-12-04

## 2018-12-04 RX ORDER — OXYCODONE HYDROCHLORIDE AND ACETAMINOPHEN 5; 325 MG/1; MG/1
1 TABLET ORAL EVERY 4 HOURS PRN
Status: DISCONTINUED | OUTPATIENT
Start: 2018-12-04 | End: 2018-12-05 | Stop reason: HOSPADM

## 2018-12-04 RX ORDER — CIPROFLOXACIN 2 MG/ML
INJECTION, SOLUTION INTRAVENOUS PRN
Status: DISCONTINUED | OUTPATIENT
Start: 2018-12-04 | End: 2018-12-04 | Stop reason: SDUPTHER

## 2018-12-04 RX ORDER — MORPHINE SULFATE 2 MG/ML
4 INJECTION, SOLUTION INTRAMUSCULAR; INTRAVENOUS EVERY 5 MIN PRN
Status: DISCONTINUED | OUTPATIENT
Start: 2018-12-04 | End: 2018-12-04

## 2018-12-04 RX ORDER — ATROPA BELLADONNA AND OPIUM 16.2; 6 MG/1; MG/1
SUPPOSITORY RECTAL PRN
Status: DISCONTINUED | OUTPATIENT
Start: 2018-12-04 | End: 2018-12-04 | Stop reason: HOSPADM

## 2018-12-04 RX ORDER — MIDAZOLAM HYDROCHLORIDE 1 MG/ML
2 INJECTION INTRAMUSCULAR; INTRAVENOUS
Status: DISCONTINUED | OUTPATIENT
Start: 2018-12-04 | End: 2018-12-04

## 2018-12-04 RX ORDER — MIDAZOLAM HYDROCHLORIDE 1 MG/ML
INJECTION INTRAMUSCULAR; INTRAVENOUS PRN
Status: DISCONTINUED | OUTPATIENT
Start: 2018-12-04 | End: 2018-12-04 | Stop reason: SDUPTHER

## 2018-12-04 RX ORDER — SODIUM CHLORIDE 0.9 % (FLUSH) 0.9 %
10 SYRINGE (ML) INJECTION PRN
Status: DISCONTINUED | OUTPATIENT
Start: 2018-12-04 | End: 2018-12-04

## 2018-12-04 RX ORDER — DIPHENHYDRAMINE HYDROCHLORIDE 50 MG/ML
12.5 INJECTION INTRAMUSCULAR; INTRAVENOUS
Status: DISCONTINUED | OUTPATIENT
Start: 2018-12-04 | End: 2018-12-04

## 2018-12-04 RX ORDER — ONDANSETRON 2 MG/ML
INJECTION INTRAMUSCULAR; INTRAVENOUS PRN
Status: DISCONTINUED | OUTPATIENT
Start: 2018-12-04 | End: 2018-12-04 | Stop reason: SDUPTHER

## 2018-12-04 RX ORDER — SODIUM CHLORIDE, SODIUM LACTATE, POTASSIUM CHLORIDE, CALCIUM CHLORIDE 600; 310; 30; 20 MG/100ML; MG/100ML; MG/100ML; MG/100ML
INJECTION, SOLUTION INTRAVENOUS CONTINUOUS
Status: DISCONTINUED | OUTPATIENT
Start: 2018-12-04 | End: 2018-12-04

## 2018-12-04 RX ADMIN — SODIUM CHLORIDE, SODIUM LACTATE, POTASSIUM CHLORIDE, AND CALCIUM CHLORIDE: 600; 310; 30; 20 INJECTION, SOLUTION INTRAVENOUS at 09:43

## 2018-12-04 RX ADMIN — ROCURONIUM BROMIDE 20 MG: 10 INJECTION INTRAVENOUS at 09:05

## 2018-12-04 RX ADMIN — SUCCINYLCHOLINE CHLORIDE 120 MG: 20 INJECTION, SOLUTION INTRAMUSCULAR; INTRAVENOUS; PARENTERAL at 08:18

## 2018-12-04 RX ADMIN — HYDROMORPHONE HYDROCHLORIDE 0.5 MG: 1 INJECTION, SOLUTION INTRAMUSCULAR; INTRAVENOUS; SUBCUTANEOUS at 10:34

## 2018-12-04 RX ADMIN — FENTANYL CITRATE 100 MCG: 50 INJECTION INTRAMUSCULAR; INTRAVENOUS at 08:18

## 2018-12-04 RX ADMIN — CIPROFLOXACIN 400 MG: 2 INJECTION INTRAVENOUS at 08:24

## 2018-12-04 RX ADMIN — SODIUM CHLORIDE, SODIUM LACTATE, POTASSIUM CHLORIDE, AND CALCIUM CHLORIDE: 600; 310; 30; 20 INJECTION, SOLUTION INTRAVENOUS at 08:12

## 2018-12-04 RX ADMIN — AMLODIPINE BESYLATE 10 MG: 10 TABLET ORAL at 14:09

## 2018-12-04 RX ADMIN — ONDANSETRON HYDROCHLORIDE 4 MG: 2 INJECTION, SOLUTION INTRAMUSCULAR; INTRAVENOUS at 10:35

## 2018-12-04 RX ADMIN — LISINOPRIL 20 MG: 20 TABLET ORAL at 14:09

## 2018-12-04 RX ADMIN — PHENYLEPHRINE HYDROCHLORIDE 40 MCG: 10 INJECTION INTRAVENOUS at 10:12

## 2018-12-04 RX ADMIN — ROCURONIUM BROMIDE 10 MG: 10 INJECTION INTRAVENOUS at 09:37

## 2018-12-04 RX ADMIN — DEXAMETHASONE SODIUM PHOSPHATE 10 MG: 10 INJECTION INTRAMUSCULAR; INTRAVENOUS at 08:28

## 2018-12-04 RX ADMIN — SODIUM CHLORIDE: 9 INJECTION, SOLUTION INTRAVENOUS at 12:00

## 2018-12-04 RX ADMIN — ROCURONIUM BROMIDE 5 MG: 10 INJECTION INTRAVENOUS at 08:18

## 2018-12-04 RX ADMIN — ROCURONIUM BROMIDE 45 MG: 10 INJECTION INTRAVENOUS at 08:23

## 2018-12-04 RX ADMIN — PROPOFOL 200 MG: 10 INJECTION, EMULSION INTRAVENOUS at 08:18

## 2018-12-04 RX ADMIN — LIDOCAINE HYDROCHLORIDE 50 MG: 10 INJECTION, SOLUTION INFILTRATION; PERINEURAL at 08:18

## 2018-12-04 RX ADMIN — FENTANYL CITRATE 50 MCG: 50 INJECTION INTRAMUSCULAR; INTRAVENOUS at 09:33

## 2018-12-04 RX ADMIN — FENTANYL CITRATE 50 MCG: 50 INJECTION INTRAMUSCULAR; INTRAVENOUS at 08:57

## 2018-12-04 RX ADMIN — MIDAZOLAM 2 MG: 1 INJECTION INTRAMUSCULAR; INTRAVENOUS at 08:10

## 2018-12-04 ASSESSMENT — LIFESTYLE VARIABLES: SMOKING_STATUS: 1

## 2018-12-04 ASSESSMENT — PAIN - FUNCTIONAL ASSESSMENT: PAIN_FUNCTIONAL_ASSESSMENT: 0-10

## 2018-12-04 NOTE — ANESTHESIA PRE PROCEDURE
Department of Anesthesiology  Preprocedure Note       Name:  Itzel Mosquera   Age:  66 y.o.  :  1960                                          MRN:  [id number]         Date:  2018      Surgeon: Nahun Harris):  Clarke Ruiz MD    Procedure: Procedure(s):  CYSTOSCOPY TRANSURETHRAL RESECTION PROSTATE (N/A     Medications prior to admission:   Prior to Admission medications    Medication Sig Start Date End Date Taking? Authorizing Provider   finasteride (PROSCAR) 5 MG tablet Take 1 tablet by mouth daily 18   Willeen Kussmaul, PA-C   tamsulosin [facility]) 0.4 MG capsule Take 2 capsules by mouth daily 18   Willeen Kussmaul, PA-C   amLODIPine (NORVASC) 10 MG tablet Take 10 mg by mouth daily    Historical Provider, MD   lisinopril (PRINIVIL;ZESTRIL) 10 MG tablet Take 20 mg by mouth daily     Historical Provider, MD       Current medications:    No current outpatient prescriptions on file. No current facility-administered medications for this visit. Allergies:     Allergies   Allergen Reactions    Lipitor [Atorvastatin] Shortness Of Breath     Chest pain    Ibuprofen      Elevates his liver enzymes    Lactose Intolerance (Gi)        Problem List:    Patient Active Problem List   Diagnosis Code    Kidney stone on left side N20.0    Benign prostatic hyperplasia with urinary obstruction N40.1, N13.8    Sepsis (HCC) A41.9    BETTY (acute kidney injury) ([facility] [address] N17.9    Epididymoorchitis N45.3       Past Medical History:        Diagnosis Date    Anemia     BPH (benign prostatic hyperplasia)     GERD (gastroesophageal reflux disease)     Gross hematuria     Hyperlipidemia     Hypertension     Renal calculi        Past Surgical History:        Procedure Laterality Date    CYSTOSCOPY      [place] FRAGMENT KIDNEY STONE/ ESWL Left 2018    ESWL EXTRACORPEAL SHOCK WAVE LITHOTRIPSY performed by Clarke Ruiz MD at [facility] OR       Social History:    Social History   Substance Use Topics    Smoking status: Never Smoker    Smokeless tobacco: Never Used    Alcohol use No                                Counseling given: Not Answered      Vital Signs (Current): There were no vitals filed for this visit. BP Readings from Last 3 Encounters:   12/04/18 (!) 160/106   09/25/18 131/85   09/11/18 (!) 148/93       NPO Status:                                                                                 BMI:   Wt Readings from Last 3 Encounters:   12/04/18 204 lb (92.5 kg)   10/29/18 213 lb (96.6 kg)   09/25/18 211 lb (95.7 kg)     There is no height or weight on file to calculate BMI.    CBC:   Lab Results   Component Value Date    WBC 8.7 08/23/2018    RBC 3.62 08/23/2018    HGB 12.0 09/11/2018    HCT 39.1 09/11/2018    MCV 85.1 08/23/2018    RDW 13.7 08/23/2018     08/23/2018       CMP:   Lab Results   Component Value Date     08/23/2018    K 4.2 08/23/2018    K 3.9 08/21/2018     08/23/2018    CO2 26 08/23/2018    BUN 9 08/23/2018    CREATININE 0.9 08/23/2018    LABGLOM >60 08/23/2018    GLUCOSE 93 08/23/2018    PROT 6.3 08/23/2018    CALCIUM 8.8 08/23/2018    BILITOT <0.2 08/23/2018    ALKPHOS 68 08/23/2018    AST 32 08/23/2018    ALT 49 08/23/2018       POC Tests: No results for input(s): POCGLU, POCNA, POCK, POCCL, POCBUN, POCHEMO, POCHCT in the last 72 hours.     Coags: No results found for: PROTIME, INR, APTT    HCG (If Applicable): No results found for: PREGTESTUR, PREGSERUM, HCG, HCGQUANT     ABGs: No results found for: PHART, PO2ART, RDQ2FOC, ZEY2PNN, BEART, R4PNKBUO     Type & Screen (If Applicable):  No results found for: LABABO, [address]    Anesthesia Evaluation  Patient summary reviewed and Nursing notes reviewed no history of anesthetic complications:   Airway: Mallampati: I  TM distance: >3 FB   Neck ROM: full  Mouth opening: > = 3 FB Dental:          Pulmonary:Negative Pulmonary ROS and normal exam    (+) current smoker                           Cardiovascular:    (+) hypertension:, hyperlipidemia      ECG reviewed               Beta Blocker:  Not on Beta Blocker         Neuro/Psych:   Negative Neuro/Psych ROS     (-) seizures and CVA           GI/Hepatic/Renal:   (+) GERD: well controlled, renal disease: kidney stones,           Endo/Other:        (-) diabetes mellitus, hypothyroidism               Abdominal:           Vascular: negative vascular ROS. Anesthesia Plan      general     ASA 2       Induction: intravenous. MIPS: Postoperative opioids intended and Prophylactic antiemetics administered. Anesthetic plan and risks discussed with patient. Plan discussed with CRNA.                   Stephen Lim MD   12/4/2018

## 2018-12-04 NOTE — OP NOTE
bladder  neck to the mid gland and then from mid gland back to apical region from  10 to 7 o'clock. Then, there was still some residual median lobe tissue  between the veru and the bladder neck, which was also taken down. There  was some residual lateral tissue sort of at the 5 and 7 o'clock areas,  which I felt was not obstructing and some residual tissue apically again  sort of high in the sphincter area, which I did not want to resect into,  which again was not obstructing. At this point, the prostatic fossa was  widely open. The [employer] evacuator was used intermittently throughout the  procedure and at the end of the procedure to collect the prostatic  tissue chips. These were sent for pathologic examination. At the end  of the procedure, hemostasis was obtained by direct cauterization. The  prostatic fossa and bladder neck was widely open. The veru was spared. There was no evidence of retained chips. The scope was removed. A  24-Y[place]i 3-way Ni catheter with catheter guide was inserted without  difficulty, 50 mL was placed in the balloon. It was seated against the  bladder in good position, hand irrigated and placed to irrigation with  normal saline. It was irrigating clear to pink tinge. The specimen was  collected and sent for pathologic examination. The patient was awakened  from his anesthetic and taken to the recovery room in stable condition.         Renetta Coulter MD    D: 12/04/2018 11:50:22      T: 12/04/2018 13:30:01     [email or web address]  Job#: [id number]     Doc#: [id number]    CC:

## 2018-12-05 VITALS
SYSTOLIC BLOOD PRESSURE: 115 MMHG | BODY MASS INDEX: 26.18 KG/M2 | DIASTOLIC BLOOD PRESSURE: 65 MMHG | WEIGHT: 204 LBS | HEIGHT: 74 IN | HEART RATE: 66 BPM | TEMPERATURE: 97.8 F | RESPIRATION RATE: 20 BRPM | OXYGEN SATURATION: 96 %

## 2018-12-05 LAB
ANION GAP SERPL CALCULATED.3IONS-SCNC: 12 MMOL/L (ref 7–19)
BASOPHILS ABSOLUTE: 0 K/UL (ref 0–0.2)
BASOPHILS RELATIVE PERCENT: 0.2 % (ref 0–1)
BUN BLDV-MCNC: 13 MG/DL (ref 6–20)
CALCIUM SERPL-MCNC: 8.6 MG/DL (ref 8.6–10)
CHLORIDE BLD-SCNC: 106 MMOL/L (ref 98–111)
CO2: 21 MMOL/L (ref 22–29)
CREAT SERPL-MCNC: 1.1 MG/DL (ref 0.5–1.2)
EOSINOPHILS ABSOLUTE: 0 K/UL (ref 0–0.6)
EOSINOPHILS RELATIVE PERCENT: 0 % (ref 0–5)
GFR NON-AFRICAN AMERICAN: >60
GLUCOSE BLD-MCNC: 120 MG/DL (ref 74–109)
HCT VFR BLD CALC: 34.5 % (ref 42–52)
HEMOGLOBIN: 11 G/DL (ref 14–18)
LYMPHOCYTES ABSOLUTE: 1.5 K/UL (ref 1.1–4.5)
LYMPHOCYTES RELATIVE PERCENT: 12 % (ref 20–40)
MCH RBC QN AUTO: 26.5 PG (ref 27–31)
MCHC RBC AUTO-ENTMCNC: 31.9 G/DL (ref 33–37)
MCV RBC AUTO: 83.1 FL (ref 80–94)
MONOCYTES ABSOLUTE: 0.7 K/UL (ref 0–0.9)
MONOCYTES RELATIVE PERCENT: 5.3 % (ref 0–10)
NEUTROPHILS ABSOLUTE: 10.3 K/UL (ref 1.5–7.5)
NEUTROPHILS RELATIVE PERCENT: 82.1 % (ref 50–65)
PDW BLD-RTO: 13.9 % (ref 11.5–14.5)
PLATELET # BLD: 349 K/UL (ref 130–400)
PMV BLD AUTO: 10.3 FL (ref 9.4–12.4)
POTASSIUM REFLEX MAGNESIUM: 4.1 MMOL/L (ref 3.5–5)
RBC # BLD: 4.15 M/UL (ref 4.7–6.1)
SODIUM BLD-SCNC: 139 MMOL/L (ref 136–145)
WBC # BLD: 12.6 K/UL (ref 4.8–10.8)

## 2018-12-05 PROCEDURE — 6370000000 HC RX 637 (ALT 250 FOR IP): Performed by: UROLOGY

## 2018-12-05 PROCEDURE — 80048 BASIC METABOLIC PNL TOTAL CA: CPT

## 2018-12-05 PROCEDURE — 85025 COMPLETE CBC W/AUTO DIFF WBC: CPT

## 2018-12-05 PROCEDURE — 99024 POSTOP FOLLOW-UP VISIT: CPT | Performed by: UROLOGY

## 2018-12-05 PROCEDURE — 36415 COLL VENOUS BLD VENIPUNCTURE: CPT

## 2018-12-05 PROCEDURE — G0378 HOSPITAL OBSERVATION PER HR: HCPCS

## 2018-12-05 RX ORDER — AMLODIPINE BESYLATE 10 MG/1
10 TABLET ORAL DAILY
Qty: 30 TABLET | Refills: 3 | Status: SHIPPED | OUTPATIENT
Start: 2018-12-06

## 2018-12-05 RX ORDER — HYDROCODONE BITARTRATE AND ACETAMINOPHEN 5; 325 MG/1; MG/1
1 TABLET ORAL EVERY 6 HOURS PRN
Qty: 12 TABLET | Refills: 0 | Status: SHIPPED | OUTPATIENT
Start: 2018-12-05 | End: 2018-12-08

## 2018-12-05 RX ORDER — CIPROFLOXACIN 500 MG/1
500 TABLET, FILM COATED ORAL 2 TIMES DAILY
Qty: 6 TABLET | Refills: 0 | Status: SHIPPED | OUTPATIENT
Start: 2018-12-05 | End: 2018-12-08

## 2018-12-05 RX ORDER — LISINOPRIL 20 MG/1
20 TABLET ORAL DAILY
Qty: 30 TABLET | Refills: 3 | Status: SHIPPED | OUTPATIENT
Start: 2018-12-06

## 2018-12-05 RX ORDER — FERROUS SULFATE 325(65) MG
325 TABLET ORAL 2 TIMES DAILY WITH MEALS
Qty: 30 TABLET | Refills: 3 | Status: SHIPPED | OUTPATIENT
Start: 2018-12-05

## 2018-12-05 RX ADMIN — AMLODIPINE BESYLATE 10 MG: 10 TABLET ORAL at 09:11

## 2018-12-05 RX ADMIN — LISINOPRIL 20 MG: 20 TABLET ORAL at 09:11

## 2019-01-04 ENCOUNTER — OFFICE VISIT (OUTPATIENT)
Dept: UROLOGY | Age: 59
End: 2019-01-04

## 2019-01-04 VITALS — BODY MASS INDEX: 26.18 KG/M2 | HEIGHT: 74 IN | WEIGHT: 204 LBS | TEMPERATURE: 96.4 F

## 2019-01-04 DIAGNOSIS — Z98.890 H/O TRANSURETHRAL RESECTION OF PROSTATE: Primary | ICD-10-CM

## 2019-01-04 DIAGNOSIS — N40.0 BENIGN PROSTATIC HYPERPLASIA WITHOUT LOWER URINARY TRACT SYMPTOMS: ICD-10-CM

## 2019-01-04 DIAGNOSIS — Z90.79 H/O TRANSURETHRAL RESECTION OF PROSTATE: Primary | ICD-10-CM

## 2019-01-04 LAB
BACTERIA URINE, POC: ABNORMAL
BILIRUBIN URINE: 0 MG/DL
BLOOD, URINE: POSITIVE
CASTS URINE, POC: ABNORMAL
CLARITY: ABNORMAL
COLOR: YELLOW
CRYSTALS URINE, POC: ABNORMAL
EPI CELLS URINE, POC: ABNORMAL
GLUCOSE URINE: ABNORMAL
KETONES, URINE: NEGATIVE
LEUKOCYTE EST, POC: ABNORMAL
NITRITE, URINE: NEGATIVE
PH UA: 5.5 (ref 4.5–8)
PROTEIN UA: POSITIVE
RBC URINE, POC: ABNORMAL
SPECIFIC GRAVITY UA: 1.03 (ref 1–1.03)
UROBILINOGEN, URINE: NORMAL
WBC URINE, POC: ABNORMAL
YEAST URINE, POC: ABNORMAL

## 2019-01-04 PROCEDURE — 99024 POSTOP FOLLOW-UP VISIT: CPT | Performed by: NURSE PRACTITIONER

## 2019-01-04 PROCEDURE — 81000 URINALYSIS NONAUTO W/SCOPE: CPT | Performed by: NURSE PRACTITIONER

## (undated) DEVICE — BAG DRNGE COMB PK

## (undated) DEVICE — Z INACTIVE USE 2660664 SOLUTION IRRIG 3000ML 0.9% SOD CHL USP UROMATIC PLAS CONT

## (undated) DEVICE — BAG URIN DRNAGE 2000ML TB L48IN NDL SAMP ANTIREFLX CHMBR DRN

## (undated) DEVICE — 60 ML SYRINGE,CATHETER TIP: Brand: MONOJECT

## (undated) DEVICE — Y-TYPE TUR/BLADDER IRRIGATION SET, REGULATING CLAMP

## (undated) DEVICE — SOLUTION IV IRRIG WATER 1000ML POUR BRL 2F7114

## (undated) DEVICE — CUTTING LOOP, BIPOLAR, 24/26 FR.: Brand: N.A.

## (undated) DEVICE — EVACUATOR URO BLDR W/ ADPT UROVAC

## (undated) DEVICE — SURGICAL PROCEDURE PACK CYTOSCOPY

## (undated) DEVICE — CATHETER FOL 24 FR 30 CC 3 W HYDRGEL COAT DOVER